# Patient Record
Sex: MALE | Race: WHITE | Employment: FULL TIME | URBAN - METROPOLITAN AREA
[De-identification: names, ages, dates, MRNs, and addresses within clinical notes are randomized per-mention and may not be internally consistent; named-entity substitution may affect disease eponyms.]

---

## 2017-09-26 ENCOUNTER — OFFICE VISIT (OUTPATIENT)
Dept: FAMILY MEDICINE CLINIC | Age: 27
End: 2017-09-26

## 2017-09-26 VITALS
DIASTOLIC BLOOD PRESSURE: 74 MMHG | BODY MASS INDEX: 24.81 KG/M2 | RESPIRATION RATE: 16 BRPM | HEART RATE: 73 BPM | SYSTOLIC BLOOD PRESSURE: 116 MMHG | WEIGHT: 163.7 LBS | TEMPERATURE: 97.9 F | HEIGHT: 68 IN | OXYGEN SATURATION: 98 %

## 2017-09-26 DIAGNOSIS — N64.52 NIPPLE DISCHARGE IN MALE: ICD-10-CM

## 2017-09-26 DIAGNOSIS — Z13.0 SCREENING FOR DEFICIENCY ANEMIA: ICD-10-CM

## 2017-09-26 DIAGNOSIS — D49.7 PITUITARY TUMOR: Primary | ICD-10-CM

## 2017-09-26 DIAGNOSIS — Z13.29 SCREENING FOR THYROID DISORDER: ICD-10-CM

## 2017-09-26 DIAGNOSIS — Z13.220 LIPID SCREENING: ICD-10-CM

## 2017-09-26 PROCEDURE — 99204 OFFICE O/P NEW MOD 45 MIN: CPT | Performed by: FAMILY MEDICINE

## 2017-09-26 ASSESSMENT — ENCOUNTER SYMPTOMS
COUGH: 0
VOMITING: 0
SHORTNESS OF BREATH: 0
FACIAL SWELLING: 0
STRIDOR: 0
SORE THROAT: 0
PHOTOPHOBIA: 0
NAUSEA: 0
ABDOMINAL DISTENTION: 0
BLOOD IN STOOL: 0
CONSTIPATION: 0
APNEA: 0
EYE PAIN: 0
EYE ITCHING: 0
EYE DISCHARGE: 0
CHEST TIGHTNESS: 0
RECTAL PAIN: 0
SINUS PRESSURE: 0
COLOR CHANGE: 0
BACK PAIN: 0
WHEEZING: 0
VOICE CHANGE: 0
ABDOMINAL PAIN: 0
ANAL BLEEDING: 0
EYE REDNESS: 0
CHOKING: 0
TROUBLE SWALLOWING: 0
DIARRHEA: 0
RHINORRHEA: 0

## 2017-09-29 DIAGNOSIS — N64.52 NIPPLE DISCHARGE IN MALE: ICD-10-CM

## 2017-09-29 DIAGNOSIS — Z13.29 SCREENING FOR THYROID DISORDER: ICD-10-CM

## 2017-09-29 DIAGNOSIS — D35.2 PITUITARY MICROADENOMA (HCC): Primary | ICD-10-CM

## 2017-09-29 DIAGNOSIS — D49.7 PITUITARY TUMOR: Primary | ICD-10-CM

## 2017-09-29 DIAGNOSIS — D49.7 PITUITARY TUMOR: ICD-10-CM

## 2017-09-29 DIAGNOSIS — Z13.220 LIPID SCREENING: ICD-10-CM

## 2017-09-29 DIAGNOSIS — Z13.0 SCREENING FOR DEFICIENCY ANEMIA: ICD-10-CM

## 2017-09-29 LAB
A/G RATIO: 1.8 (ref 1.1–2.2)
ALBUMIN SERPL-MCNC: 4.8 G/DL (ref 3.4–5)
ALP BLD-CCNC: 95 U/L (ref 40–129)
ALT SERPL-CCNC: 27 U/L (ref 10–40)
ANION GAP SERPL CALCULATED.3IONS-SCNC: 14 MMOL/L (ref 3–16)
AST SERPL-CCNC: 23 U/L (ref 15–37)
BILIRUB SERPL-MCNC: 0.8 MG/DL (ref 0–1)
BUN BLDV-MCNC: 12 MG/DL (ref 7–20)
CALCIUM SERPL-MCNC: 9.7 MG/DL (ref 8.3–10.6)
CHLORIDE BLD-SCNC: 99 MMOL/L (ref 99–110)
CHOLESTEROL, TOTAL: 166 MG/DL (ref 0–199)
CO2: 27 MMOL/L (ref 21–32)
CREAT SERPL-MCNC: 0.9 MG/DL (ref 0.9–1.3)
GFR AFRICAN AMERICAN: >60
GFR NON-AFRICAN AMERICAN: >60
GLOBULIN: 2.7 G/DL
GLUCOSE BLD-MCNC: 93 MG/DL (ref 70–99)
HCT VFR BLD CALC: 47.1 % (ref 40.5–52.5)
HDLC SERPL-MCNC: 48 MG/DL (ref 40–60)
HEMOGLOBIN: 16.1 G/DL (ref 13.5–17.5)
LDL CHOLESTEROL CALCULATED: 95 MG/DL
MCH RBC QN AUTO: 31.3 PG (ref 26–34)
MCHC RBC AUTO-ENTMCNC: 34.2 G/DL (ref 31–36)
MCV RBC AUTO: 91.6 FL (ref 80–100)
PDW BLD-RTO: 12.7 % (ref 12.4–15.4)
PLATELET # BLD: 228 K/UL (ref 135–450)
PMV BLD AUTO: 8.3 FL (ref 5–10.5)
POTASSIUM SERPL-SCNC: 4.2 MMOL/L (ref 3.5–5.1)
PROLACTIN: 64.8 NG/ML
RBC # BLD: 5.15 M/UL (ref 4.2–5.9)
SODIUM BLD-SCNC: 140 MMOL/L (ref 136–145)
TOTAL PROTEIN: 7.5 G/DL (ref 6.4–8.2)
TRIGL SERPL-MCNC: 114 MG/DL (ref 0–150)
TSH SERPL DL<=0.05 MIU/L-ACNC: 0.52 UIU/ML (ref 0.27–4.2)
VLDLC SERPL CALC-MCNC: 23 MG/DL
WBC # BLD: 6.9 K/UL (ref 4–11)

## 2017-09-30 DIAGNOSIS — R79.89 ELEVATED PROLACTIN LEVEL: Primary | ICD-10-CM

## 2017-09-30 DIAGNOSIS — Z87.898 HISTORY OF PITUITARY TUMOR: ICD-10-CM

## 2017-10-03 LAB
SEX HORMONE BINDING GLOBULIN: 60 NMOL/L (ref 11–80)
TESTOSTERONE FREE-NONMALE: 36.2 PG/ML (ref 47–244)
TESTOSTERONE TOTAL: 278 NG/DL (ref 220–1000)

## 2017-10-04 ENCOUNTER — TELEPHONE (OUTPATIENT)
Dept: FAMILY MEDICINE CLINIC | Age: 27
End: 2017-10-04

## 2017-10-04 DIAGNOSIS — N64.52 NIPPLE DISCHARGE IN MALE: Primary | ICD-10-CM

## 2017-10-04 NOTE — TELEPHONE ENCOUNTER
[address]  [phone]    Radiologist wants PT to have bilateral diagnostic mammogram before ultrasound  Requesting order be entered in  Epic

## 2017-10-05 NOTE — TELEPHONE ENCOUNTER
Called pt to give scheduling dept number to call to schedule at [facility].   Close Encounter

## 2017-10-13 ENCOUNTER — HOSPITAL ENCOUNTER (OUTPATIENT)
Dept: ULTRASOUND IMAGING | Age: 27
Discharge: HOME | End: 2017-10-13
Admitting: FAMILY MEDICINE

## 2017-10-13 DIAGNOSIS — N64.52 NIPPLE DISCHARGE IN MALE: ICD-10-CM

## 2017-10-13 DIAGNOSIS — D49.7 NEOPLASM OF UNSPECIFIED BEHAVIOR OF ENDOCRINE GLANDS AND OTHER PARTS OF NERVOUS SYSTEM: ICD-10-CM

## 2017-10-13 DIAGNOSIS — D49.7 PITUITARY TUMOR: ICD-10-CM

## 2017-10-13 RX ORDER — SODIUM CHLORIDE 0.9 % (FLUSH) 0.9 %
10 SYRINGE (ML) INJECTION ONCE
Status: COMPLETED | OUTPATIENT
Start: 2017-10-13 | End: 2017-10-13

## 2017-10-13 RX ADMIN — Medication 10 ML: at 09:58

## 2017-12-01 ENCOUNTER — TELEPHONE (OUTPATIENT)
Dept: FAMILY MEDICINE CLINIC | Age: 27
End: 2017-12-01

## 2017-12-01 PROBLEM — D35.2 PITUITARY MICROADENOMA (HCC): Status: ACTIVE | Noted: 2017-12-01

## 2017-12-01 PROBLEM — N62 GYNECOMASTIA, MALE: Status: ACTIVE | Noted: 2017-12-01

## 2017-12-01 PROBLEM — R79.89 ELEVATED PROLACTIN LEVEL: Status: ACTIVE | Noted: 2017-12-01

## 2017-12-01 NOTE — TELEPHONE ENCOUNTER
Pt informed to come in when he can and we will work him in as soon as we can.  Ok per dr. Ga Scarce  Close Encounter

## 2017-12-01 NOTE — TELEPHONE ENCOUNTER
[phone]   Jacqueshowie Maday    PT is set for a flu shot at 3:30, wants to know if he can come in earlier. Is already in the area, doesn't want to have to drive home and then come back. Please advise patient.

## 2017-12-07 ENCOUNTER — OFFICE VISIT (OUTPATIENT)
Dept: ENDOCRINOLOGY | Age: 27
End: 2017-12-07

## 2017-12-07 VITALS
OXYGEN SATURATION: 97 % | DIASTOLIC BLOOD PRESSURE: 72 MMHG | BODY MASS INDEX: 25.31 KG/M2 | WEIGHT: 167 LBS | SYSTOLIC BLOOD PRESSURE: 109 MMHG | HEART RATE: 74 BPM | HEIGHT: 68 IN

## 2017-12-07 DIAGNOSIS — D35.2 PITUITARY MICROADENOMA (HCC): ICD-10-CM

## 2017-12-07 DIAGNOSIS — N64.52 NIPPLE DISCHARGE IN MALE: ICD-10-CM

## 2017-12-07 DIAGNOSIS — N62 GYNECOMASTIA, MALE: ICD-10-CM

## 2017-12-07 DIAGNOSIS — R79.89 ELEVATED PROLACTIN LEVEL: ICD-10-CM

## 2017-12-07 DIAGNOSIS — R79.89 ELEVATED PROLACTIN LEVEL: Primary | ICD-10-CM

## 2017-12-07 PROCEDURE — 99244 OFF/OP CNSLTJ NEW/EST MOD 40: CPT | Performed by: INTERNAL MEDICINE

## 2017-12-07 ASSESSMENT — PATIENT HEALTH QUESTIONNAIRE - PHQ9
2. FEELING DOWN, DEPRESSED OR HOPELESS: 0
1. LITTLE INTEREST OR PLEASURE IN DOING THINGS: 0
SUM OF ALL RESPONSES TO PHQ QUESTIONS 1-9: 0
SUM OF ALL RESPONSES TO PHQ9 QUESTIONS 1 & 2: 0

## 2017-12-07 NOTE — PROGRESS NOTES
depression, anxiety, sleep disturbance and decreased concentration. Objective:   Physical Exam:  /72 (Site: Left Arm, Position: Sitting, Cuff Size: Large Adult)   Pulse 74   Ht 5' 8\" (1.727 m)   Wt 167 lb (75.8 kg)   SpO2 97%   BMI 25.39 kg/m²   Constitutional: Patient is oriented to person, place, and time. Patient appears well-developed and well-nourished. HENT:    Head: Normocephalic and atraumatic. Eyes: Conjunctivae and EOM are normal. Pupils are equal, round, and reactive to light. Neck: Normal range of motion. Thyroid exam normal   Cardiovascular: Normal rate, regular rhythm and normal heart sounds. Pulmonary/Chest: Effort normal and breath sounds normal. Bilateral gynecomastia, axillary lymph nodes were not palpable. Abdominal: Soft. Bowel sounds are normal.   Musculoskeletal: Normal range of motion. _  Neurological: Patient is alert and oriented to person, place, and time. Patient has normal reflexes. Skin: Skin is warm and dry. Psychiatric: Patient has a normal mood and affect.  Patient behavior is normal.     Lab Review:    Orders Only on 09/29/2017   Component Date Value Ref Range Status    Testosterone 10/03/2017 278  220 - 1,000 ng/dL Final    Sex Hormone Binding 10/03/2017 60  11 - 80 nmol/L Final    Testosterone, Free 10/03/2017 36.2* 47 - 244 pg/mL Final   Orders Only on 09/29/2017   Component Date Value Ref Range Status    Cholesterol, Total 09/29/2017 166  0 - 199 mg/dL Final    Triglycerides 09/29/2017 114  0 - 150 mg/dL Final    HDL 09/29/2017 48  40 - 60 mg/dL Final    LDL Calculated 09/29/2017 95  <100 mg/dL Final    VLDL CHOLESTEROL CALCULATED 09/29/2017 23  Not Established mg/dL Final    Sodium 09/29/2017 140  136 - 145 mmol/L Final    Potassium 09/29/2017 4.2  3.5 - 5.1 mmol/L Final    Chloride 09/29/2017 99  99 - 110 mmol/L Final    CO2 09/29/2017 27  21 - 32 mmol/L Final    Anion Gap 09/29/2017 14  3 - 16 Final    Glucose 09/29/2017 93  70 - 99 mg/dL Final    BUN 09/29/2017 12  7 - 20 mg/dL Final    CREATININE 09/29/2017 0.9  0.9 - 1.3 mg/dL Final    GFR Non- 09/29/2017 >60  >60 Final    GFR  09/29/2017 >60  >60 Final    Calcium 09/29/2017 9.7  8.3 - 10.6 mg/dL Final    Total Protein 09/29/2017 7.5  6.4 - 8.2 g/dL Final    Alb 09/29/2017 4.8  3.4 - 5.0 g/dL Final    Albumin/Globulin Ratio 09/29/2017 1.8  1.1 - 2.2 Final    Total Bilirubin 09/29/2017 0.8  0.0 - 1.0 mg/dL Final    Alkaline Phosphatase 09/29/2017 95  40 - 129 U/L Final    ALT 09/29/2017 27  10 - 40 U/L Final    AST 09/29/2017 23  15 - 37 U/L Final    Globulin 09/29/2017 2.7  g/dL Final    WBC 09/29/2017 6.9  4.0 - 11.0 K/uL Final    RBC 09/29/2017 5.15  4.20 - 5.90 M/uL Final    Hemoglobin 09/29/2017 16.1  13.5 - 17.5 g/dL Final    Hematocrit 09/29/2017 47.1  40.5 - 52.5 % Final    MCV 09/29/2017 91.6  80.0 - 100.0 fL Final    MCH 09/29/2017 31.3  26.0 - 34.0 pg Final    MCHC 09/29/2017 34.2  31.0 - 36.0 g/dL Final    RDW 09/29/2017 12.7  12.4 - 15.4 % Final    Platelets 03/32/2804 228  135 - 450 K/uL Final    MPV 09/29/2017 8.3  5.0 - 10.5 fL Final    Prolactin 09/29/2017 64.8  ng/mL Final    TSH 09/29/2017 0.52  0.27 - 4.20 uIU/mL Final           Assessment and Plan     Elisha Stanton was seen today for new patient. Diagnoses and all orders for this visit:    Elevated [facility] level ([facility]  -     Prolactin; Future  -     T4, Free; Future  -     T3, Free; Future  -     TSH without Reflex; Future  -     Comprehensive Metabolic Panel; Future  -     MISCELLANEOUS SENDOUT 1; Future  -     Prolactin; Standing  -     Comprehensive Metabolic Panel; Standing    Pituitary microadenoma (HCC)  -     T4, Free; Future  -     T3, Free; Future  -     TSH without Reflex;  Future  -     Prolactin; Standing  -     Comprehensive Metabolic Panel; Standing    Gynecomastia, male    Nipple discharge in male:left        1: Hyperprolactinemia

## 2017-12-08 ENCOUNTER — TELEPHONE (OUTPATIENT)
Dept: ENDOCRINOLOGY | Age: 27
End: 2017-12-08

## 2017-12-08 DIAGNOSIS — D35.2 PITUITARY MICROADENOMA (HCC): ICD-10-CM

## 2017-12-08 DIAGNOSIS — R79.89 ELEVATED PROLACTIN LEVEL: Primary | ICD-10-CM

## 2017-12-08 LAB
A/G RATIO: 2 (ref 1.1–2.2)
ALBUMIN SERPL-MCNC: 4.9 G/DL (ref 3.4–5)
ALP BLD-CCNC: 80 U/L (ref 40–129)
ALT SERPL-CCNC: 19 U/L (ref 10–40)
ANION GAP SERPL CALCULATED.3IONS-SCNC: 13 MMOL/L (ref 3–16)
AST SERPL-CCNC: 20 U/L (ref 15–37)
BILIRUB SERPL-MCNC: 0.7 MG/DL (ref 0–1)
BUN BLDV-MCNC: 14 MG/DL (ref 7–20)
CALCIUM SERPL-MCNC: 9.8 MG/DL (ref 8.3–10.6)
CHLORIDE BLD-SCNC: 100 MMOL/L (ref 99–110)
CO2: 28 MMOL/L (ref 21–32)
CREAT SERPL-MCNC: 0.8 MG/DL (ref 0.9–1.3)
GFR AFRICAN AMERICAN: >60
GFR NON-AFRICAN AMERICAN: >60
GLOBULIN: 2.4 G/DL
GLUCOSE BLD-MCNC: 90 MG/DL (ref 70–99)
POTASSIUM SERPL-SCNC: 4.3 MMOL/L (ref 3.5–5.1)
PROLACTIN: 49.3 NG/ML
SODIUM BLD-SCNC: 141 MMOL/L (ref 136–145)
T3 FREE: 3.5 PG/ML (ref 2.3–4.2)
T4 FREE: 1.3 NG/DL (ref 0.9–1.8)
TOTAL PROTEIN: 7.3 G/DL (ref 6.4–8.2)
TSH SERPL DL<=0.05 MIU/L-ACNC: 0.4 UIU/ML (ref 0.27–4.2)

## 2017-12-08 RX ORDER — CABERGOLINE 0.5 MG/1
0.25 TABLET ORAL
Qty: 8 TABLET | Refills: 1 | Status: SHIPPED | OUTPATIENT
Start: 2017-12-11 | End: 2018-04-06 | Stop reason: SDUPTHER

## 2017-12-08 NOTE — TELEPHONE ENCOUNTER
----- Message from Fe Nieves MD sent at 12/8/2017 10:14 AM EST -----  Please make him a follow up in 3 months.

## 2017-12-09 LAB — MISCELLANEOUS LAB TEST ORDER: ABNORMAL

## 2018-03-07 ENCOUNTER — TELEPHONE (OUTPATIENT)
Dept: ENDOCRINOLOGY | Age: 28
End: 2018-03-07

## 2018-03-13 ENCOUNTER — OFFICE VISIT (OUTPATIENT)
Dept: ENDOCRINOLOGY | Age: 28
End: 2018-03-13

## 2018-03-13 VITALS
WEIGHT: 164 LBS | BODY MASS INDEX: 24.86 KG/M2 | HEIGHT: 68 IN | DIASTOLIC BLOOD PRESSURE: 86 MMHG | OXYGEN SATURATION: 98 % | HEART RATE: 70 BPM | SYSTOLIC BLOOD PRESSURE: 149 MMHG

## 2018-03-13 DIAGNOSIS — R79.89 ELEVATED PROLACTIN LEVEL: ICD-10-CM

## 2018-03-13 DIAGNOSIS — D35.2 PITUITARY MICROADENOMA (HCC): ICD-10-CM

## 2018-03-13 DIAGNOSIS — E29.1 HYPOGONADISM IN MALE: ICD-10-CM

## 2018-03-13 DIAGNOSIS — D35.2 PITUITARY MICROADENOMA (HCC): Primary | ICD-10-CM

## 2018-03-13 LAB
A/G RATIO: 2.5 (ref 1.1–2.2)
ALBUMIN SERPL-MCNC: 4.9 G/DL (ref 3.4–5)
ALP BLD-CCNC: 69 U/L (ref 40–129)
ALT SERPL-CCNC: 17 U/L (ref 10–40)
ANION GAP SERPL CALCULATED.3IONS-SCNC: 15 MMOL/L (ref 3–16)
AST SERPL-CCNC: 15 U/L (ref 15–37)
BILIRUB SERPL-MCNC: 0.8 MG/DL (ref 0–1)
BUN BLDV-MCNC: 10 MG/DL (ref 7–20)
CALCIUM SERPL-MCNC: 9.5 MG/DL (ref 8.3–10.6)
CHLORIDE BLD-SCNC: 101 MMOL/L (ref 99–110)
CO2: 30 MMOL/L (ref 21–32)
CREAT SERPL-MCNC: 0.7 MG/DL (ref 0.9–1.3)
GFR AFRICAN AMERICAN: >60
GFR NON-AFRICAN AMERICAN: >60
GLOBULIN: 2 G/DL
GLUCOSE BLD-MCNC: 96 MG/DL (ref 70–99)
POTASSIUM SERPL-SCNC: 4.1 MMOL/L (ref 3.5–5.1)
PROLACTIN: 2.5 NG/ML
SODIUM BLD-SCNC: 146 MMOL/L (ref 136–145)
TOTAL PROTEIN: 6.9 G/DL (ref 6.4–8.2)

## 2018-03-13 PROCEDURE — 99214 OFFICE O/P EST MOD 30 MIN: CPT | Performed by: INTERNAL MEDICINE

## 2018-03-13 ASSESSMENT — PATIENT HEALTH QUESTIONNAIRE - PHQ9
SUM OF ALL RESPONSES TO PHQ QUESTIONS 1-9: 0
1. LITTLE INTEREST OR PLEASURE IN DOING THINGS: 0
SUM OF ALL RESPONSES TO PHQ9 QUESTIONS 1 & 2: 0
2. FEELING DOWN, DEPRESSED OR HOPELESS: 0

## 2018-03-13 NOTE — PROGRESS NOTES
Patient ID:   Dinesh Guo is a 32 y.o. male      Chief Complaint:   Dinesh Guo is seen for an evaluation of elevated prolactin levels. Subjective:   He was Prolactin 52.9 in April 2012, highest 102 in June 2014 and then 3.9 in Sep 2014. MRI in 2012 showed 6mm pituitary adenoma which resolved in 2013 (only stalk deviation seen). W up was negative for Gh, cortisol and thyroid hormone in 2012. Prolactin 64.8, , Free T slightly low at 36.8, TSH normal in Sep 2017   MRI brain in Oct 2017 showed right sided pituitary esion of 7x5mm hypoenhancing, deviating the infundubulun to left. Optic chiasm normal.     Mammogram and US breast showed gynecomastia in Oct 2017. Slightly more on left than right. Prolactin 49 (in house) , 27.4 ( [facility])     Cabergoline 0.25mg twice weekly (Wednesday and Sundays)   Bromocriptine caused lethargy. Then switched to cabergoline and stopped in 2015. Testoterone levels normalized on treatment. Nipple discharge and breast tenderness has resolved. Libido good, performance good, have morning erections now.     Energy levels good, warmer usually as compared to his wife   Regular day to day stress   Has a 3year old daughter, not trying at this time to conceive     Not on antipsychotics, SSRIs, estrogen supplements, antiemetics, Ca channel blockers, methyldopa, opioids  Denies drugs (heroine)     No family history of hyperprolactinemia or any endocrine tumors     The following portions of the patient's history were reviewed and updated as appropriate:       Family History   Problem Relation Age of Onset    No Known Problems Mother     Heart Disease Father     Mult Sclerosis Father     Diabetes Maternal Grandmother     Cancer Paternal Grandmother      leukemia    No Known Problems Paternal Grandfather     No Known Problems Sister     No Known Problems Brother          Social History     Social History    Marital status:      Spouse name: N/A   Vicki Pugh Number of children: N/A    Years of education: N/A     Occupational History    [employer]:. Social History Main Topics    Smoking status: Never Smoker    Smokeless tobacco: Never Used    Alcohol use Yes      Comment: 1 drink per day     Drug use: No    Sexual activity: Yes     Partners: Female      Comment: - 1 child     Other Topics Concern    Not on file     Social History Narrative    No narrative on file         Past Medical History:   Diagnosis Date    Athlete's foot     Brain tumor ([facility] 04/2012    Per pt' was pituitary that has resolved.  Eye problem     Right:eye growth:under care of eye specialist:asymptomatic. No vision deficits.  Increased prolactin level (HCC)     Pituitary microadenoma ([facility] 12/1/2017         Past Surgical History:   Procedure Laterality Date    WISDOM TOOTH EXTRACTION         No Known Allergies      Current Outpatient Prescriptions:     cabergoline (DOSTINEX) 0.5 MG tablet, Take 0.5 tablets by mouth Twice a Week, Disp: 8 tablet, Rfl: 1    Review of Systems:  Constitutional: Negative for fever, chills, and unexpected weight change. HENT: Negative for congestion, ear pain, rhinorrhea,  sore throat and trouble swallowing. Eyes: Negative for photophobia, redness, itching. Respiratory: Negative for cough, shortness of breath and sputum. Cardiovascular: Negative for chest pain, palpitations and leg swelling. Gastrointestinal: Negative for nausea, vomiting, abdominal pain, diarrhea, constipation. Endocrine: Negative for cold intolerance, heat intolerance, polydipsia, polyphagia and polyuria. Genitourinary: Negative for dysuria, urgency, frequency, hematuria and flank pain. Musculoskeletal: Negative for myalgias, back pain, arthralgias and neck pain. Skin/Nail: Negative for rash, itching. Normal nails. Neurological: Negative for seizures, weakness, light-headedness, numbness and headaches.    Hematological/ Lymph nodes: Negative for

## 2018-04-06 DIAGNOSIS — R79.89 ELEVATED PROLACTIN LEVEL: ICD-10-CM

## 2018-04-06 DIAGNOSIS — D35.2 PITUITARY MICROADENOMA (HCC): ICD-10-CM

## 2018-04-06 RX ORDER — CABERGOLINE 0.5 MG/1
0.25 TABLET ORAL
Qty: 8 TABLET | Refills: 1 | Status: SHIPPED | OUTPATIENT
Start: 2018-04-09 | End: 2018-07-24 | Stop reason: SDUPTHER

## 2018-07-23 DIAGNOSIS — E29.1 HYPOGONADISM IN MALE: ICD-10-CM

## 2018-07-23 DIAGNOSIS — R79.89 ELEVATED PROLACTIN LEVEL: ICD-10-CM

## 2018-07-23 DIAGNOSIS — D35.2 PITUITARY MICROADENOMA (HCC): ICD-10-CM

## 2018-07-23 LAB — PROLACTIN: 1.2 NG/ML

## 2018-07-24 ENCOUNTER — OFFICE VISIT (OUTPATIENT)
Dept: ENDOCRINOLOGY | Age: 28
End: 2018-07-24

## 2018-07-24 VITALS
HEART RATE: 74 BPM | HEIGHT: 68 IN | SYSTOLIC BLOOD PRESSURE: 115 MMHG | OXYGEN SATURATION: 96 % | WEIGHT: 167 LBS | BODY MASS INDEX: 25.31 KG/M2 | DIASTOLIC BLOOD PRESSURE: 71 MMHG

## 2018-07-24 DIAGNOSIS — D35.2 PITUITARY MICROADENOMA (HCC): ICD-10-CM

## 2018-07-24 DIAGNOSIS — D49.7 PITUITARY TUMOR: Primary | ICD-10-CM

## 2018-07-24 DIAGNOSIS — R79.89 ELEVATED PROLACTIN LEVEL: ICD-10-CM

## 2018-07-24 LAB
SEX HORMONE BINDING GLOBULIN: 42 NMOL/L (ref 11–80)
TESTOSTERONE FREE PERCENT: 1.7 % (ref 1.6–2.9)
TESTOSTERONE FREE, CALC: 91 PG/ML (ref 47–244)
TESTOSTERONE TOTAL-MALE: 550 NG/DL (ref 300–1080)
TESTOSTERONE, BIOAVAILABLE: 259 NG/DL (ref 131–682)

## 2018-07-24 PROCEDURE — 99214 OFFICE O/P EST MOD 30 MIN: CPT | Performed by: INTERNAL MEDICINE

## 2018-07-24 RX ORDER — CABERGOLINE 0.5 MG/1
0.25 TABLET ORAL
Qty: 8 TABLET | Refills: 2 | Status: SHIPPED | OUTPATIENT
Start: 2018-07-26 | End: 2018-10-01 | Stop reason: SDUPTHER

## 2018-07-24 ASSESSMENT — PATIENT HEALTH QUESTIONNAIRE - PHQ9
2. FEELING DOWN, DEPRESSED OR HOPELESS: 0
SUM OF ALL RESPONSES TO PHQ9 QUESTIONS 1 & 2: 0
1. LITTLE INTEREST OR PLEASURE IN DOING THINGS: 0
SUM OF ALL RESPONSES TO PHQ QUESTIONS 1-9: 0

## 2018-07-24 NOTE — PROGRESS NOTES
 Marital status:      Spouse name: N/A    Number of children: N/A    Years of education: N/A     Occupational History    [employer]:. Social History Main Topics    Smoking status: Never Smoker    Smokeless tobacco: Never Used    Alcohol use Yes      Comment: 1 drink per day     Drug use: No    Sexual activity: Yes     Partners: Female      Comment: - 1 child     Other Topics Concern    Not on file     Social History Narrative    No narrative on file         Past Medical History:   Diagnosis Date    Athlete's foot     Brain tumor ([facility] 04/2012    Per pt' was pituitary that has resolved.  Eye problem     Right:eye growth:under care of eye specialist:asymptomatic. No vision deficits.  Increased prolactin level (HCC)     Pituitary microadenoma ([facility] 12/1/2017         Past Surgical History:   Procedure Laterality Date    WISDOM TOOTH EXTRACTION         No Known Allergies      Current Outpatient Prescriptions:     [START ON 7/26/2018] cabergoline (DOSTINEX) 0.5 MG tablet, Take 0.5 tablets by mouth Twice a Week, Disp: 8 tablet, Rfl: 2    Review of Systems:  Constitutional: Negative for fever, chills, and unexpected weight change. HENT: Negative for congestion, ear pain, rhinorrhea,  sore throat and trouble swallowing. Eyes: Negative for photophobia, redness, itching. Respiratory: Negative for cough, shortness of breath and sputum. Cardiovascular: Negative for chest pain, palpitations and leg swelling. Gastrointestinal: Negative for nausea, vomiting, abdominal pain, diarrhea, constipation. Endocrine: Negative for cold intolerance, heat intolerance, polydipsia, polyphagia and polyuria. Genitourinary: Negative for dysuria, urgency, frequency, hematuria and flank pain. Musculoskeletal: Negative for myalgias, back pain, arthralgias and neck pain. Skin/Nail: Negative for rash, itching. Normal nails.    Neurological: Negative for seizures, weakness, Assessment and Plan     Azeb Morton was seen today for follow-up. Diagnoses and all orders for this visit:    Pituitary tumor  -     Prolactin; Standing  -     Comprehensive Metabolic Panel; Future  -     TSH without Reflex; Future  -     T4, Free; Future  -     T3, Free; Future  -     MRI PITUITARY W/WO CONTRAST; Future  -     Testosterone,  w SHBG Adult Male; Future  -     Follicle Stimulating Hormone; Future  -     Luteinizing Hormone; Future    Pituitary microadenoma (HCC)  -     Prolactin; Standing  -     Comprehensive Metabolic Panel; Future  -     TSH without Reflex; Future  -     T4, Free; Future  -     T3, Free; Future  -     cabergoline (DOSTINEX) 0.5 MG tablet; Take 0.5 tablets by mouth Twice a Week  -     MRI PITUITARY W/WO CONTRAST; Future    Elevated prolactin level (HCC)  -     cabergoline (DOSTINEX) 0.5 MG tablet; Take 0.5 tablets by mouth Twice a Week        1: Hyperprolactinemia   Microprolactinoma     Prolactin normal     Will start cutting Cabergoline when the MRI shows no tumor. Symptoms improved     Recommend having VF exam     C/w cabergoline 0.25mg twice a week.  No murmur today     MRI in Oct 2018     2: Hypogonadism   Will recheck when prolactin is suppressed     3: Pituitary microadenoma   Repeat MRI Oct 2018     Prolactin levels in 2 months with MRI   Prolactin levels in Dec 2018  Prolactin in 6 months with RTC with TFTs, TT, LH, FSH, CMP    Elissa Sims MD

## 2018-09-25 ENCOUNTER — TELEPHONE (OUTPATIENT)
Dept: FAMILY MEDICINE CLINIC | Age: 28
End: 2018-09-25

## 2018-10-01 DIAGNOSIS — R79.89 ELEVATED PROLACTIN LEVEL: ICD-10-CM

## 2018-10-01 DIAGNOSIS — D35.2 PITUITARY MICROADENOMA (HCC): ICD-10-CM

## 2018-10-01 RX ORDER — CABERGOLINE 0.5 MG/1
0.25 TABLET ORAL
Qty: 8 TABLET | Refills: 2 | Status: SHIPPED | OUTPATIENT
Start: 2018-10-01 | End: 2019-04-04 | Stop reason: SDUPTHER

## 2018-10-02 ENCOUNTER — OFFICE VISIT (OUTPATIENT)
Dept: FAMILY MEDICINE CLINIC | Age: 28
End: 2018-10-02
Payer: COMMERCIAL

## 2018-10-02 VITALS
OXYGEN SATURATION: 98 % | DIASTOLIC BLOOD PRESSURE: 68 MMHG | TEMPERATURE: 98.6 F | SYSTOLIC BLOOD PRESSURE: 106 MMHG | HEIGHT: 68 IN | HEART RATE: 74 BPM | WEIGHT: 166.3 LBS | RESPIRATION RATE: 16 BRPM | BODY MASS INDEX: 25.2 KG/M2

## 2018-10-02 DIAGNOSIS — B35.1 ONYCHOMYCOSIS: ICD-10-CM

## 2018-10-02 DIAGNOSIS — Z13.220 LIPID SCREENING: ICD-10-CM

## 2018-10-02 DIAGNOSIS — E66.3 OVERWEIGHT (BMI 25.0-29.9): ICD-10-CM

## 2018-10-02 DIAGNOSIS — D35.2 PITUITARY MICROADENOMA (HCC): ICD-10-CM

## 2018-10-02 DIAGNOSIS — Z23 NEED FOR INFLUENZA VACCINATION: ICD-10-CM

## 2018-10-02 DIAGNOSIS — D49.7 PITUITARY TUMOR: ICD-10-CM

## 2018-10-02 DIAGNOSIS — Z00.00 ROUTINE GENERAL MEDICAL EXAMINATION AT A HEALTH CARE FACILITY: Primary | ICD-10-CM

## 2018-10-02 PROCEDURE — 90686 IIV4 VACC NO PRSV 0.5 ML IM: CPT | Performed by: FAMILY MEDICINE

## 2018-10-02 PROCEDURE — 99395 PREV VISIT EST AGE 18-39: CPT | Performed by: FAMILY MEDICINE

## 2018-10-02 PROCEDURE — 90471 IMMUNIZATION ADMIN: CPT | Performed by: FAMILY MEDICINE

## 2018-10-02 RX ORDER — TERBINAFINE HYDROCHLORIDE 250 MG/1
250 TABLET ORAL DAILY
Qty: 30 TABLET | Refills: 2 | Status: SHIPPED | OUTPATIENT
Start: 2018-10-02 | End: 2018-11-01

## 2018-10-02 ASSESSMENT — ENCOUNTER SYMPTOMS
EYE ITCHING: 0
COUGH: 0
EYE DISCHARGE: 0
COLOR CHANGE: 0
SORE THROAT: 0
DIARRHEA: 0
SINUS PRESSURE: 0
VOMITING: 0
RHINORRHEA: 0
ABDOMINAL PAIN: 0
WHEEZING: 0
RECTAL PAIN: 0
VOICE CHANGE: 0
STRIDOR: 0
BACK PAIN: 0
SHORTNESS OF BREATH: 0
TROUBLE SWALLOWING: 0
SINUS PAIN: 0
ABDOMINAL DISTENTION: 0
EYE PAIN: 0
APNEA: 0
ANAL BLEEDING: 0
CHEST TIGHTNESS: 0
NAUSEA: 0
CONSTIPATION: 0
CHOKING: 0
BLOOD IN STOOL: 0
FACIAL SWELLING: 0
PHOTOPHOBIA: 0
EYE REDNESS: 0

## 2018-10-02 ASSESSMENT — PATIENT HEALTH QUESTIONNAIRE - PHQ9
2. FEELING DOWN, DEPRESSED OR HOPELESS: 0
SUM OF ALL RESPONSES TO PHQ QUESTIONS 1-9: 0
1. LITTLE INTEREST OR PLEASURE IN DOING THINGS: 0
SUM OF ALL RESPONSES TO PHQ QUESTIONS 1-9: 0
SUM OF ALL RESPONSES TO PHQ9 QUESTIONS 1 & 2: 0

## 2018-10-02 NOTE — PATIENT INSTRUCTIONS
condoms. For men  · Tests for sexually transmitted infections (STIs). Ask whether you should have tests for STIs. You may be at risk if you have sex with more than one person, especially if you do not wear a condom. · Testicular cancer exam. Ask your doctor whether you should check your testicles regularly. · Prostate exam. Talk to your doctor about whether you should have a blood test (called a PSA test) for prostate cancer. Experts differ on whether and when men should have this test. Some experts suggest it if you are older than 39 and are -American or have a father or brother who got prostate cancer when he was younger than 72. When should you call for help? Watch closely for changes in your health, and be sure to contact your doctor if you have any problems or symptoms that concern you. Where can you learn more? Go to https://Ravnpeyohaneb.health[email or web address] org and sign in to your [employer] account. Enter P072 in the LogLogic box to learn more about \"Well Visit, Ages 25 to 48: Care Instructions. \"     If you do not have an account, please click on the \"Sign Up Now\" link. Current as of: May 16, 2017  Content Version: 11.7  © 0422-8041 [employer]. Care instructions adapted under license by [facility] ([facility]). If you have questions about a medical condition or this instruction, always ask your healthcare professional. [address]  any warranty or liability for your use of this information.

## 2018-10-02 NOTE — PROGRESS NOTES
Subjective:      Patient ID: Sara Mcgowan is a 32 y.o. male. HPI  Presenting for:Routine Physical exam  PSA:N/A.  Performs self-testicular exams:Yes  Eye check:<12mos. Dental check:<12mos. Lipid check:wishes to proceed. Colonoscopy:N/A  Immunizations:see below. Last tetanus vaccine per pt' received <10yrs ago. Tomasz Rendon Exercise:formal regime=no but walks daily at work. Smoker:no. Nail fungus both toes:prior topical med failed. Presenting w/mild right hallux/right 5th toe;left 2nd toe nail discolored(yellow-light green) x 2-3yrs. Not worsening or improving. Preceding injury:none recalled. Associated w/\"crumbling\"/thick nails. Antalgic Gait:no. Worsened by nothing. Improves w/nothing. Denies foot pain or other skin lesions/BLE fockfapc-kydmemfab-bdiwurofqzu/ulcers/fever/chills/gait abnormality/open wounds        Immunization History   Administered Date(s) Administered    Hepatitis B Adult (Engerix-B) 03/20/2018, 05/24/2018    MMR 03/20/2018    PPD Test 07/12/2017, 03/20/2018    Varicella (Varivax) 03/20/2018, 05/04/2018       No Known Allergies    Current Outpatient Prescriptions on File Prior to Visit   Medication Sig Dispense Refill    cabergoline (DOSTINEX) 0.5 MG tablet Take 0.5 tablets by mouth Twice a Week 8 tablet 2     No current facility-administered medications on file prior to visit. Past Medical History:   Diagnosis Date    Athlete's foot     Brain tumor ([address] 04/2012    Per pt' was pituitary that has resolved.  Eye problem     Right:eye growth:under care of eye specialist:asymptomatic. No vision deficits.     Increased prolactin level (HCC)     Pituitary microadenoma ([address] 12/1/2017       Past Surgical History:   Procedure Laterality Date    WISDOM TOOTH EXTRACTION         Social History   Substance Use Topics    Smoking status: Never Smoker    Smokeless tobacco: Never Used    Alcohol use Yes      Comment: 1 drink per day      History   Drug Use No       Family refill=2-3secs   Psychiatric: He has a normal mood and affect. His speech is normal and behavior is normal. Judgment and thought content normal. Cognition and memory are normal.   Good eye contact. Assessment:       Diagnosis Orders   1. Routine general medical examination at a health care facility  VSS/well appearing. Doing well. 2. Pituitary microadenoma ([address]  Per endo. Keep upcoming appt for MRI brain. 3. Pituitary tumor  Per endo. 4. Lipid screening  Lipid Panel    Comprehensive Metabolic Panel   5. Overweight (BMI 25.0-29. 9)  The patient is asked to make an attempt to improve diet and exercise patterns to aid in medical management of this problem. 6. Need for influenza vaccination  INFLUENZA, QUADV, 3 YRS AND OLDER, IM, Vaccine Counseling:Risks and benefits of vaccines discussed with patient and questions answered. PF, PREFILL SYR OR SDV, 0.5ML (FLUZONE QUADV, PF)   7. Onychomycosis:lamisil x00nfevi. CMP(LFT) this week prior to starting med. LFTs in 4weeks after starting med. Possible med side effects reviewed. Pt' wishes to proceed w/med        Plan:      Pt' left office in good condition. Obtain labs/diagnostic tests as discussed today & call back for results within 2-7days. Advised to go to local ER or call 911 for any worrisome signs/sx.           Jennifer Wallace MD

## 2018-10-04 ENCOUNTER — HOSPITAL ENCOUNTER (OUTPATIENT)
Dept: MRI IMAGING | Age: 28
Discharge: HOME | End: 2018-10-04
Payer: COMMERCIAL

## 2018-10-04 DIAGNOSIS — D35.2 PITUITARY MICROADENOMA (HCC): ICD-10-CM

## 2018-10-04 DIAGNOSIS — D49.7 PITUITARY TUMOR: ICD-10-CM

## 2018-10-04 PROCEDURE — 6360000004 HC RX CONTRAST MEDICATION: Performed by: FAMILY MEDICINE

## 2018-10-04 PROCEDURE — A9579 GAD-BASE MR CONTRAST NOS,1ML: HCPCS | Performed by: FAMILY MEDICINE

## 2018-10-04 PROCEDURE — 70553 MRI BRAIN STEM W/O & W/DYE: CPT

## 2018-10-04 PROCEDURE — 2580000003 HC RX 258: Performed by: FAMILY MEDICINE

## 2018-10-04 RX ORDER — 0.9 % SODIUM CHLORIDE 0.9 %
10 VIAL (ML) INJECTION
Status: COMPLETED | OUTPATIENT
Start: 2018-10-04 | End: 2018-10-04

## 2018-10-04 RX ADMIN — Medication 10 ML: at 15:48

## 2018-10-04 RX ADMIN — GADOTERIDOL 15 ML: 279.3 INJECTION, SOLUTION INTRAVENOUS at 15:48

## 2018-10-08 DIAGNOSIS — Z13.220 LIPID SCREENING: ICD-10-CM

## 2018-10-08 LAB
A/G RATIO: 2.1 (ref 1.1–2.2)
ALBUMIN SERPL-MCNC: 4.7 G/DL (ref 3.4–5)
ALP BLD-CCNC: 70 U/L (ref 40–129)
ALT SERPL-CCNC: 17 U/L (ref 10–40)
ANION GAP SERPL CALCULATED.3IONS-SCNC: 12 MMOL/L (ref 3–16)
AST SERPL-CCNC: 17 U/L (ref 15–37)
BILIRUB SERPL-MCNC: 1.1 MG/DL (ref 0–1)
BUN BLDV-MCNC: 16 MG/DL (ref 7–20)
CALCIUM SERPL-MCNC: 9.6 MG/DL (ref 8.3–10.6)
CHLORIDE BLD-SCNC: 105 MMOL/L (ref 99–110)
CHOLESTEROL, TOTAL: 152 MG/DL (ref 0–199)
CO2: 25 MMOL/L (ref 21–32)
CREAT SERPL-MCNC: 1 MG/DL (ref 0.9–1.3)
GFR AFRICAN AMERICAN: >60
GFR NON-AFRICAN AMERICAN: >60
GLOBULIN: 2.2 G/DL
GLUCOSE BLD-MCNC: 93 MG/DL (ref 70–99)
HDLC SERPL-MCNC: 50 MG/DL (ref 40–60)
LDL CHOLESTEROL CALCULATED: 87 MG/DL
POTASSIUM SERPL-SCNC: 4.4 MMOL/L (ref 3.5–5.1)
SODIUM BLD-SCNC: 142 MMOL/L (ref 136–145)
TOTAL PROTEIN: 6.9 G/DL (ref 6.4–8.2)
TRIGL SERPL-MCNC: 77 MG/DL (ref 0–150)
VLDLC SERPL CALC-MCNC: 15 MG/DL

## 2019-01-30 ENCOUNTER — OFFICE VISIT (OUTPATIENT)
Dept: ENDOCRINOLOGY | Age: 29
End: 2019-01-30
Payer: COMMERCIAL

## 2019-01-30 VITALS
BODY MASS INDEX: 25.76 KG/M2 | HEART RATE: 66 BPM | DIASTOLIC BLOOD PRESSURE: 69 MMHG | OXYGEN SATURATION: 100 % | HEIGHT: 68 IN | SYSTOLIC BLOOD PRESSURE: 110 MMHG | WEIGHT: 170 LBS

## 2019-01-30 DIAGNOSIS — R79.89 ELEVATED PROLACTIN LEVEL: ICD-10-CM

## 2019-01-30 DIAGNOSIS — D35.2 PITUITARY MICROADENOMA (HCC): Primary | ICD-10-CM

## 2019-01-30 DIAGNOSIS — D35.2 PITUITARY MICROADENOMA (HCC): ICD-10-CM

## 2019-01-30 PROBLEM — N64.52 NIPPLE DISCHARGE IN MALE: Status: RESOLVED | Noted: 2017-09-26 | Resolved: 2019-01-30

## 2019-01-30 LAB
A/G RATIO: 2 (ref 1.1–2.2)
ALBUMIN SERPL-MCNC: 4.7 G/DL (ref 3.4–5)
ALP BLD-CCNC: 75 U/L (ref 40–129)
ALT SERPL-CCNC: 23 U/L (ref 10–40)
ANION GAP SERPL CALCULATED.3IONS-SCNC: 14 MMOL/L (ref 3–16)
AST SERPL-CCNC: 18 U/L (ref 15–37)
BILIRUB SERPL-MCNC: 0.8 MG/DL (ref 0–1)
BUN BLDV-MCNC: 12 MG/DL (ref 7–20)
CALCIUM SERPL-MCNC: 9.7 MG/DL (ref 8.3–10.6)
CHLORIDE BLD-SCNC: 104 MMOL/L (ref 99–110)
CO2: 26 MMOL/L (ref 21–32)
CREAT SERPL-MCNC: 0.9 MG/DL (ref 0.9–1.3)
GFR AFRICAN AMERICAN: >60
GFR NON-AFRICAN AMERICAN: >60
GLOBULIN: 2.4 G/DL
GLUCOSE BLD-MCNC: 91 MG/DL (ref 70–99)
POTASSIUM SERPL-SCNC: 4.2 MMOL/L (ref 3.5–5.1)
PROLACTIN: 0.9 NG/ML
SODIUM BLD-SCNC: 144 MMOL/L (ref 136–145)
TOTAL PROTEIN: 7.1 G/DL (ref 6.4–8.2)

## 2019-01-30 PROCEDURE — 99214 OFFICE O/P EST MOD 30 MIN: CPT | Performed by: INTERNAL MEDICINE

## 2019-01-30 RX ORDER — TERBINAFINE HYDROCHLORIDE 250 MG/1
TABLET ORAL
COMMUNITY
Start: 2018-12-28 | End: 2019-02-08 | Stop reason: ALTCHOICE

## 2019-02-08 ENCOUNTER — OFFICE VISIT (OUTPATIENT)
Dept: FAMILY MEDICINE CLINIC | Age: 29
End: 2019-02-08
Payer: COMMERCIAL

## 2019-02-08 VITALS
DIASTOLIC BLOOD PRESSURE: 76 MMHG | SYSTOLIC BLOOD PRESSURE: 117 MMHG | WEIGHT: 169.1 LBS | BODY MASS INDEX: 25.63 KG/M2 | RESPIRATION RATE: 16 BRPM | HEIGHT: 68 IN | HEART RATE: 71 BPM

## 2019-02-08 DIAGNOSIS — B35.1 ONYCHOMYCOSIS: Primary | ICD-10-CM

## 2019-02-08 DIAGNOSIS — D35.2 PITUITARY MICROADENOMA (HCC): ICD-10-CM

## 2019-02-08 PROCEDURE — 99213 OFFICE O/P EST LOW 20 MIN: CPT | Performed by: FAMILY MEDICINE

## 2019-02-08 RX ORDER — TERBINAFINE HYDROCHLORIDE 250 MG/1
250 TABLET ORAL DAILY
Qty: 30 TABLET | Refills: 2 | Status: CANCELLED | OUTPATIENT
Start: 2019-02-08 | End: 2019-03-10

## 2019-02-08 ASSESSMENT — PATIENT HEALTH QUESTIONNAIRE - PHQ9
2. FEELING DOWN, DEPRESSED OR HOPELESS: 0
SUM OF ALL RESPONSES TO PHQ QUESTIONS 1-9: 0
1. LITTLE INTEREST OR PLEASURE IN DOING THINGS: 0
SUM OF ALL RESPONSES TO PHQ9 QUESTIONS 1 & 2: 0
SUM OF ALL RESPONSES TO PHQ QUESTIONS 1-9: 0

## 2019-02-08 ASSESSMENT — ENCOUNTER SYMPTOMS
ABDOMINAL DISTENTION: 0
ABDOMINAL PAIN: 0
SHORTNESS OF BREATH: 0

## 2019-03-13 ENCOUNTER — OFFICE VISIT (OUTPATIENT)
Dept: FAMILY MEDICINE CLINIC | Age: 29
End: 2019-03-13
Payer: COMMERCIAL

## 2019-03-13 VITALS
OXYGEN SATURATION: 98 % | WEIGHT: 167.2 LBS | SYSTOLIC BLOOD PRESSURE: 110 MMHG | DIASTOLIC BLOOD PRESSURE: 76 MMHG | HEART RATE: 64 BPM | BODY MASS INDEX: 25.34 KG/M2 | HEIGHT: 68 IN

## 2019-03-13 DIAGNOSIS — K64.9 HEMORRHOIDS, UNSPECIFIED HEMORRHOID TYPE: ICD-10-CM

## 2019-03-13 DIAGNOSIS — M51.36 LUMBAR DEGENERATIVE DISC DISEASE: ICD-10-CM

## 2019-03-13 PROBLEM — D49.7 PITUITARY TUMOR: Status: RESOLVED | Noted: 2017-09-26 | Resolved: 2019-03-13

## 2019-03-13 PROBLEM — M51.369 LUMBAR DEGENERATIVE DISC DISEASE: Status: ACTIVE | Noted: 2019-03-13

## 2019-03-13 PROCEDURE — 99214 OFFICE O/P EST MOD 30 MIN: CPT | Performed by: FAMILY MEDICINE

## 2019-03-13 PROCEDURE — 90471 IMMUNIZATION ADMIN: CPT | Performed by: FAMILY MEDICINE

## 2019-03-13 PROCEDURE — 90715 TDAP VACCINE 7 YRS/> IM: CPT | Performed by: FAMILY MEDICINE

## 2019-03-13 SDOH — HEALTH STABILITY: MENTAL HEALTH: HOW MANY STANDARD DRINKS CONTAINING ALCOHOL DO YOU HAVE ON A TYPICAL DAY?: 1 OR 2

## 2019-04-04 DIAGNOSIS — D35.2 PITUITARY MICROADENOMA (HCC): ICD-10-CM

## 2019-04-04 DIAGNOSIS — R79.89 ELEVATED PROLACTIN LEVEL: ICD-10-CM

## 2019-04-04 RX ORDER — CABERGOLINE 0.5 MG/1
0.25 TABLET ORAL
Qty: 8 TABLET | Refills: 0 | Status: SHIPPED | OUTPATIENT
Start: 2019-04-04 | End: 2019-07-17 | Stop reason: SDUPTHER

## 2019-04-04 NOTE — TELEPHONE ENCOUNTER
Medication: cabergoline   Dose:0.5 mg   Sig: one by mouth twice weekly    Last refilled: 10/1/2018      Last seen: 1/30/2019  Follow up: none on file

## 2019-07-01 ENCOUNTER — OFFICE VISIT (OUTPATIENT)
Dept: FAMILY MEDICINE CLINIC | Age: 29
End: 2019-07-01
Payer: COMMERCIAL

## 2019-07-01 VITALS
OXYGEN SATURATION: 98 % | SYSTOLIC BLOOD PRESSURE: 118 MMHG | HEIGHT: 68 IN | HEART RATE: 66 BPM | WEIGHT: 167 LBS | DIASTOLIC BLOOD PRESSURE: 78 MMHG | BODY MASS INDEX: 25.31 KG/M2

## 2019-07-01 DIAGNOSIS — M54.50 ACUTE RIGHT-SIDED LOW BACK PAIN WITHOUT SCIATICA: Primary | ICD-10-CM

## 2019-07-01 DIAGNOSIS — H10.31 ACUTE CONJUNCTIVITIS OF RIGHT EYE, UNSPECIFIED ACUTE CONJUNCTIVITIS TYPE: ICD-10-CM

## 2019-07-01 DIAGNOSIS — Z30.09 VASECTOMY EVALUATION: ICD-10-CM

## 2019-07-01 PROCEDURE — 99213 OFFICE O/P EST LOW 20 MIN: CPT | Performed by: FAMILY MEDICINE

## 2019-07-10 ENCOUNTER — HOSPITAL ENCOUNTER (OUTPATIENT)
Dept: PHYSICAL THERAPY | Age: 29
Setting detail: THERAPIES SERIES
Discharge: HOME | End: 2019-07-10
Payer: COMMERCIAL

## 2019-07-10 PROCEDURE — 97161 PT EVAL LOW COMPLEX 20 MIN: CPT

## 2019-07-10 PROCEDURE — 97110 THERAPEUTIC EXERCISES: CPT

## 2019-07-10 NOTE — PLAN OF CARE
7/10                          [x] Patient history, allergies, meds reviewed. Medical chart reviewed. See intake form. 7/10     Review Of Systems (ROS):  [x]Performed Review of systems (Integumentary, CardioPulmonary, Neurological) by intake and observation. Intake form has been scanned into medical record. Patient has been instructed to contact their primary care physician regarding ROS issues if not already being addressed at this time. 7/10     Co-morbidities/Complexities (which will affect course of rehabilitation):   [x]None              Arthritic conditions   []Rheumatoid arthritis (M05.9)  []Osteoarthritis (M19.91)    Cardiovascular conditions   []Hypertension (I10)  []Hyperlipidemia (E78.5)  []Angina pectoris (I20)  []Atherosclerosis (I70)    Musculoskeletal conditions   []Disc pathology   []Congenital spine pathologies   []Prior surgical intervention  []Osteoporosis (M81.8)  []Osteopenia (M85.8)   Endocrine conditions   []Hypothyroid (E03.9)  []Hyperthyroid Gastrointestinal conditions   []Constipation (F97.76)    Metabolic conditions   []Morbid obesity (E66.01)  []Diabetes type 1(E10.65) or 2 (E11.65)   []Neuropathy (G60.9)      Pulmonary conditions   []Asthma (J45)  []Coughing   []COPD (J44.9)    Psychological Disorders  []Anxiety (F41.9)  []Depression (F32.9)   []Other:    []Other:            Barriers to/and or personal factors that will affect rehab potential:              [x]Age  [x]Sex              [x]Motivation/Lack of Motivation                        []Co-Morbidities              []Cognitive Function, education/learning barriers              []Environmental, home barriers              []profession/work barriers  []past PT/medical experience   []other:  Justification:      Falls Risk Assessment (30 days):   [x] Falls Risk assessed and no intervention required.   [] Falls Risk assessed and Patient requires intervention due to being higher risk   TUG score (>12s at risk):     [] Falls education

## 2019-07-16 DIAGNOSIS — D35.2 PITUITARY MICROADENOMA (HCC): ICD-10-CM

## 2019-07-16 DIAGNOSIS — R79.89 ELEVATED PROLACTIN LEVEL: ICD-10-CM

## 2019-07-16 LAB
A/G RATIO: 2.2 (ref 1.1–2.2)
ALBUMIN SERPL-MCNC: 4.8 G/DL (ref 3.4–5)
ALP BLD-CCNC: 67 U/L (ref 40–129)
ALT SERPL-CCNC: 26 U/L (ref 10–40)
ANION GAP SERPL CALCULATED.3IONS-SCNC: 13 MMOL/L (ref 3–16)
AST SERPL-CCNC: 16 U/L (ref 15–37)
BILIRUB SERPL-MCNC: 0.9 MG/DL (ref 0–1)
BUN BLDV-MCNC: 11 MG/DL (ref 7–20)
CALCIUM SERPL-MCNC: 9.8 MG/DL (ref 8.3–10.6)
CHLORIDE BLD-SCNC: 102 MMOL/L (ref 99–110)
CO2: 27 MMOL/L (ref 21–32)
CREAT SERPL-MCNC: 0.9 MG/DL (ref 0.9–1.3)
GFR AFRICAN AMERICAN: >60
GFR NON-AFRICAN AMERICAN: >60
GLOBULIN: 2.2 G/DL
GLUCOSE BLD-MCNC: 96 MG/DL (ref 70–99)
POTASSIUM SERPL-SCNC: 4.3 MMOL/L (ref 3.5–5.1)
PROLACTIN: 1.6 NG/ML
SODIUM BLD-SCNC: 142 MMOL/L (ref 136–145)
TOTAL PROTEIN: 7 G/DL (ref 6.4–8.2)

## 2019-07-17 ENCOUNTER — OFFICE VISIT (OUTPATIENT)
Dept: ENDOCRINOLOGY | Age: 29
End: 2019-07-17
Payer: COMMERCIAL

## 2019-07-17 ENCOUNTER — HOSPITAL ENCOUNTER (OUTPATIENT)
Dept: PHYSICAL THERAPY | Age: 29
Setting detail: THERAPIES SERIES
Discharge: HOME | End: 2019-07-17
Payer: COMMERCIAL

## 2019-07-17 VITALS
HEIGHT: 68 IN | HEART RATE: 70 BPM | BODY MASS INDEX: 25.46 KG/M2 | WEIGHT: 168 LBS | OXYGEN SATURATION: 97 % | DIASTOLIC BLOOD PRESSURE: 76 MMHG | SYSTOLIC BLOOD PRESSURE: 118 MMHG

## 2019-07-17 DIAGNOSIS — R79.89 ELEVATED PROLACTIN LEVEL: ICD-10-CM

## 2019-07-17 DIAGNOSIS — D35.2 PITUITARY MICROADENOMA (HCC): ICD-10-CM

## 2019-07-17 PROCEDURE — 99214 OFFICE O/P EST MOD 30 MIN: CPT | Performed by: INTERNAL MEDICINE

## 2019-07-17 PROCEDURE — 97110 THERAPEUTIC EXERCISES: CPT

## 2019-07-17 PROCEDURE — 97112 NEUROMUSCULAR REEDUCATION: CPT

## 2019-07-17 RX ORDER — CABERGOLINE 0.5 MG/1
0.25 TABLET ORAL
Qty: 8 TABLET | Refills: 2 | Status: SHIPPED | OUTPATIENT
Start: 2019-07-18 | End: 2020-08-17

## 2019-07-17 NOTE — PROGRESS NOTES
Marital status:      Spouse name: Not on file    Number of children: Not on file    Years of education: Not on file    Highest education level: Not on file   Occupational History    Occupation: [employer]:. Social Needs    Financial resource strain: Not on file    Food insecurity:     Worry: Not on file     Inability: Not on file    Transportation needs:     Medical: Not on file     Non-medical: Not on file   Tobacco Use    Smoking status: Never Smoker    Smokeless tobacco: Never Used   Substance and Sexual Activity    Alcohol use: Yes     Drinks per session: 1 or 2     Comment: 1 drink per day     Drug use: No    Sexual activity: Yes     Partners: Female     Comment: - 1 child   Lifestyle    Physical activity:     Days per week: Not on file     Minutes per session: Not on file    Stress: Not on file   Relationships    Social connections:     Talks on phone: Not on file     Gets together: Not on file     Attends Rastafarian service: Not on file     Active member of club or organization: Not on file     Attends meetings of clubs or organizations: Not on file     Relationship status: Not on file    Intimate partner violence:     Fear of current or ex partner: Not on file     Emotionally abused: Not on file     Physically abused: Not on file     Forced sexual activity: Not on file   Other Topics Concern    Not on file   Social History Narrative    Not on file         Past Medical History:   Diagnosis Date    Athlete's foot     Brain tumor ([facility] 04/2012    Per pt' was pituitary that has resolved.  Eye problem     Right:eye growth:under care of eye specialist:asymptomatic. No vision deficits.     Increased prolactin level (HCC)     Pituitary microadenoma ([facility] 12/1/2017         Past Surgical History:   Procedure Laterality Date    WISDOM TOOTH EXTRACTION         No Known Allergies      Current Outpatient Medications:     [START ON 7/18/2019] cabergoline (DOSTINEX) 0.5 MG

## 2019-07-17 NOTE — FLOWSHEET NOTE
The [facility] and [facility], [facility] [address] [facility], [address] [place] [address]  Phone: [phone]   Fax:     [phone]    Physical Therapy Daily Treatment Note  Date:  2019    Patient Name:  Luly Maddox    :  1990  MRN: [id number]  Restrictions/Precautions:    Medical/Treatment Diagnosis Information:  Diagnosis: M54.5 (ICD-10-CM) - Acute right-sided low back pain without sciatica  Treatment Diagnosis: M54.5 Pain in Lumbar region  Insurance/Certification information:  PT Insurance Information: Aetna  Physician Information:  Referring Practitioner: Radha Penny MD  Plan of care signed (Y/N):     Date of Patient follow up with Physician:     G-Code (if applicable):      Date G-Code Applied:  7/10/19   [place]: 12% deficit    Progress Note: []  Yes  []  No  Next due by: Visit #10      Latex Allergy:  [x]NO      []YES  Preferred Language for Healthcare:   [x]English       []other:    Visit # Insurance Allowable   2   UNL     Pain level:  0/10     SUBJECTIVE:   Patient states that he is doing pretty well. He states that he had a little bit of pain while he was holding one of his daughters but he had been holding her for about an hour.      OBJECTIVE:   ROM  7/10   Comments   Trunk flexion WNL + tightness present    Trunk extension 25% impaired + pain at end range    Trunk R sidebend WNL    Trunk L sidebend WNL    Trunk R rotation 25% deficit + tightness present    Trunk L rotation WNL    HS flexibility                        Strength  7/10 Left Right Comments   Hip flexion(L2) 5 5    Knee extension(L3) 5 5    Knee flexion(S1-2) 5 5    Ankle dorsiflexion(L4) 5 5    Toe extension(L5) 5 5    Ankle eversion/plantar flexion(S1) 5 5    Hip abd             Special tests  7/10   Comments   SLR      Slump test      Pelvic symmetry       Segmental Spinal mobility      Heel walk negative     Toe walk negative     Tandem walk negative

## 2019-07-24 ENCOUNTER — HOSPITAL ENCOUNTER (OUTPATIENT)
Dept: PHYSICAL THERAPY | Age: 29
Setting detail: THERAPIES SERIES
Discharge: HOME | End: 2019-07-24
Payer: COMMERCIAL

## 2019-07-24 PROCEDURE — 97112 NEUROMUSCULAR REEDUCATION: CPT

## 2019-07-24 PROCEDURE — 97110 THERAPEUTIC EXERCISES: CPT

## 2019-07-24 NOTE — FLOWSHEET NOTE
[address], [place] [facility] [address] [place] [address], [address] [place] [address]  Phone: [phone]   Fax:     [phone]    Physical Therapy Daily Treatment Note  Date:  2019    Patient Name:  Santino Castillo    :  1990  MRN: [id number]  Restrictions/Precautions:    Medical/Treatment Diagnosis Information:  Diagnosis: M54.5 (ICD-10-CM) - Acute right-sided low back pain without sciatica  Treatment Diagnosis: M54.5 Pain in Lumbar region  Insurance/Certification information:  PT Insurance Information: Aetna  Physician Information:  Referring Practitioner: Rianna Baker MD  Plan of care signed (Y/N):     Date of Patient follow up with Physician:     G-Code (if applicable):      Date G-Code Applied:  7/10/19   [place]: 12% deficit    Progress Note: []  Yes  []  No  Next due by: Visit #10      Latex Allergy:  [x]NO      []YES  Preferred Language for Healthcare:   [x]English       []other:    Visit # Insurance Allowable   3   UNL     Pain level:  0/10     SUBJECTIVE:   Patient states that his back is doing okay. He states that he has had some flare-ups that will last for 10-15 minutes while he is holding his daughter and go away quickly. He states that he can attribute the pain to poor posture.       OBJECTIVE:   ROM  /10   Comments   Trunk flexion WNL + tightness present    Trunk extension 25% impaired + pain at end range    Trunk R sidebend WNL    Trunk L sidebend WNL    Trunk R rotation 25% deficit + tightness present    Trunk L rotation WNL    HS flexibility                        Strength  7/10 Left Right Comments   Hip flexion(L2) 5 5    Knee extension(L3) 5 5    Knee flexion(S1-2) 5 5    Ankle dorsiflexion(L4) 5 5    Toe extension(L5) 5 5    Ankle eversion/plantar flexion(S1) 5 5    Hip abd             Special tests  7/10   Comments   SLR      Slump test      Pelvic symmetry       Segmental Spinal mobility      Heel walk negative

## 2019-10-31 ENCOUNTER — HOSPITAL ENCOUNTER (OUTPATIENT)
Dept: MRI IMAGING | Age: 29
Discharge: HOME | End: 2019-10-31
Payer: COMMERCIAL

## 2019-10-31 DIAGNOSIS — R79.89 ELEVATED PROLACTIN LEVEL: ICD-10-CM

## 2019-10-31 DIAGNOSIS — D35.2 PITUITARY MICROADENOMA (HCC): ICD-10-CM

## 2019-10-31 PROCEDURE — 2580000003 HC RX 258

## 2019-10-31 PROCEDURE — A9577 INJ MULTIHANCE: HCPCS

## 2019-10-31 PROCEDURE — 6360000004 HC RX CONTRAST MEDICATION

## 2019-10-31 PROCEDURE — 70553 MRI BRAIN STEM W/O & W/DYE: CPT

## 2019-10-31 RX ORDER — SODIUM CHLORIDE 0.9 % (FLUSH) 0.9 %
10 SYRINGE (ML) INJECTION ONCE
Status: COMPLETED | OUTPATIENT
Start: 2019-10-31 | End: 2019-10-31

## 2019-10-31 RX ADMIN — GADOBENATE DIMEGLUMINE 15 ML: 529 INJECTION, SOLUTION INTRAVENOUS at 08:26

## 2019-10-31 RX ADMIN — Medication 10 ML: at 08:26

## 2019-11-04 ENCOUNTER — NURSE ONLY (OUTPATIENT)
Dept: FAMILY MEDICINE CLINIC | Age: 29
End: 2019-11-04
Payer: COMMERCIAL

## 2019-11-04 DIAGNOSIS — Z23 NEED FOR INFLUENZA VACCINATION: Primary | ICD-10-CM

## 2019-11-04 PROCEDURE — 90471 IMMUNIZATION ADMIN: CPT | Performed by: NURSE PRACTITIONER

## 2019-11-04 PROCEDURE — 90686 IIV4 VACC NO PRSV 0.5 ML IM: CPT | Performed by: NURSE PRACTITIONER

## 2020-08-11 ENCOUNTER — OFFICE VISIT (OUTPATIENT)
Dept: PRIMARY CARE CLINIC | Age: 30
End: 2020-08-11
Payer: COMMERCIAL

## 2020-08-11 PROCEDURE — 99211 OFF/OP EST MAY X REQ PHY/QHP: CPT | Performed by: NURSE PRACTITIONER

## 2020-08-11 NOTE — PROGRESS NOTES
Lesvia Ray received a viral test for COVID-19. They were educated on isolation and quarantine as appropriate. For any symptoms, they were directed to seek care from their PCP, given contact information to establish with a doctor, directed to an urgent care or the emergency room.

## 2020-08-12 LAB
SARS-COV-2: NOT DETECTED
SOURCE: NORMAL

## 2020-08-17 ENCOUNTER — OFFICE VISIT (OUTPATIENT)
Dept: FAMILY MEDICINE CLINIC | Age: 30
End: 2020-08-17
Payer: COMMERCIAL

## 2020-08-17 VITALS
WEIGHT: 162 LBS | HEIGHT: 68 IN | DIASTOLIC BLOOD PRESSURE: 82 MMHG | TEMPERATURE: 96 F | OXYGEN SATURATION: 98 % | SYSTOLIC BLOOD PRESSURE: 118 MMHG | HEART RATE: 66 BPM | BODY MASS INDEX: 24.55 KG/M2

## 2020-08-17 PROBLEM — K64.9 HEMORRHOID: Chronic | Status: ACTIVE | Noted: 2019-03-13

## 2020-08-17 PROBLEM — R79.89 ELEVATED PROLACTIN LEVEL: Chronic | Status: ACTIVE | Noted: 2017-12-01

## 2020-08-17 PROBLEM — M51.369 LUMBAR DEGENERATIVE DISC DISEASE: Chronic | Status: ACTIVE | Noted: 2019-03-13

## 2020-08-17 PROBLEM — M51.36 LUMBAR DEGENERATIVE DISC DISEASE: Chronic | Status: ACTIVE | Noted: 2019-03-13

## 2020-08-17 PROBLEM — D35.2 PITUITARY MICROADENOMA (HCC): Chronic | Status: ACTIVE | Noted: 2017-12-01

## 2020-08-17 PROBLEM — N62 GYNECOMASTIA, MALE: Chronic | Status: ACTIVE | Noted: 2017-12-01

## 2020-08-17 PROCEDURE — 93000 ELECTROCARDIOGRAM COMPLETE: CPT | Performed by: FAMILY MEDICINE

## 2020-08-17 PROCEDURE — 99214 OFFICE O/P EST MOD 30 MIN: CPT | Performed by: FAMILY MEDICINE

## 2020-08-17 RX ORDER — OMEPRAZOLE 20 MG/1
20 CAPSULE, DELAYED RELEASE ORAL
Qty: 30 CAPSULE | Refills: 0 | Status: SHIPPED | OUTPATIENT
Start: 2020-08-17 | End: 2020-09-09

## 2020-08-17 ASSESSMENT — PATIENT HEALTH QUESTIONNAIRE - PHQ9
2. FEELING DOWN, DEPRESSED OR HOPELESS: 0
1. LITTLE INTEREST OR PLEASURE IN DOING THINGS: 0
SUM OF ALL RESPONSES TO PHQ QUESTIONS 1-9: 0
SUM OF ALL RESPONSES TO PHQ QUESTIONS 1-9: 0
SUM OF ALL RESPONSES TO PHQ9 QUESTIONS 1 & 2: 0

## 2020-08-17 ASSESSMENT — ENCOUNTER SYMPTOMS
SHORTNESS OF BREATH: 0
DIARRHEA: 0
RHINORRHEA: 0
SORE THROAT: 0
ABDOMINAL PAIN: 0
CHEST TIGHTNESS: 0
CONSTIPATION: 0

## 2020-08-17 NOTE — PATIENT INSTRUCTIONS
Waldemar Gambino,  It was a pleasure meeting you today. As discussed:  · Get your imaging done soon- you can call [phone] to schedule your imaging. · I have sent in the prescriptions as discussed to your pharmacy, you can call your pharmacy for all refill requests. · Please get your labs done at your earliest convenience-Fasting. We will call you within 48 hours with the results. Please do not hesitate to call or send a message if you have questions or concerns. Our goal is to ensure your complete wellbeing.    Enjoy the rest of the Week    Dr Scott Hawkins

## 2020-08-17 NOTE — PROGRESS NOTES
palpitations and leg swelling. Gastrointestinal: Negative for abdominal pain, constipation and diarrhea. Genitourinary: Negative for dysuria and frequency. Musculoskeletal: Negative for arthralgias. Neurological: Negative for dizziness, weakness and headaches. Psychiatric/Behavioral: Negative for decreased concentration, hallucinations and sleep disturbance. The patient is not nervous/anxious. All other systems reviewed and are negative. ROS above this line reviewed by Provider. Objective:   /82 (Site: Left Upper Arm, Position: Sitting, Cuff Size: Medium Adult)   Pulse 66   Temp 96 °F (35.6 °C) (Infrared)   Ht 5' 8\" (1.727 m)   Wt 162 lb (73.5 kg)   SpO2 98%   BMI 24.63 kg/m²   Physical Exam  Vitals signs and nursing note reviewed. Constitutional:       General: He is not in acute distress. Appearance: Normal appearance. He is normal weight. He is not ill-appearing, toxic-appearing or diaphoretic. HENT:      Head: Normocephalic and atraumatic. Eyes:      General: No scleral icterus. Conjunctiva/sclera: Conjunctivae normal.   Neck:      Musculoskeletal: Normal range of motion. Cardiovascular:      Rate and Rhythm: Normal rate and regular rhythm. Heart sounds: Normal heart sounds. No murmur. No friction rub. No gallop. Pulmonary:      Effort: Pulmonary effort is normal. No respiratory distress. Breath sounds: Normal breath sounds. No stridor. No wheezing, rhonchi or rales. Chest:      Chest wall: No tenderness. Abdominal:      General: Abdomen is flat. Bowel sounds are normal. There is no distension. Palpations: Abdomen is soft. Tenderness: There is abdominal tenderness (epigastric ). Skin:     General: Skin is warm and dry. Neurological:      Mental Status: He is alert. Psychiatric:         Mood and Affect: Mood normal.         Behavior: Behavior normal.       Assessment and Plan:   1.  Other chest pain  Atypical, non reproducible on

## 2020-08-18 DIAGNOSIS — Z13.220 LIPID SCREENING: ICD-10-CM

## 2020-08-18 DIAGNOSIS — R07.89 OTHER CHEST PAIN: ICD-10-CM

## 2020-08-18 LAB
BASOPHILS ABSOLUTE: 0.1 K/UL (ref 0–0.2)
BASOPHILS RELATIVE PERCENT: 1 %
EOSINOPHILS ABSOLUTE: 0.2 K/UL (ref 0–0.6)
EOSINOPHILS RELATIVE PERCENT: 3.1 %
HCT VFR BLD CALC: 48.3 % (ref 40.5–52.5)
HEMOGLOBIN: 16.3 G/DL (ref 13.5–17.5)
LYMPHOCYTES ABSOLUTE: 2.3 K/UL (ref 1–5.1)
LYMPHOCYTES RELATIVE PERCENT: 31.3 %
MCH RBC QN AUTO: 30.8 PG (ref 26–34)
MCHC RBC AUTO-ENTMCNC: 33.7 G/DL (ref 31–36)
MCV RBC AUTO: 91.3 FL (ref 80–100)
MONOCYTES ABSOLUTE: 0.5 K/UL (ref 0–1.3)
MONOCYTES RELATIVE PERCENT: 6.9 %
NEUTROPHILS ABSOLUTE: 4.3 K/UL (ref 1.7–7.7)
NEUTROPHILS RELATIVE PERCENT: 57.7 %
PDW BLD-RTO: 12.9 % (ref 12.4–15.4)
PLATELET # BLD: 295 K/UL (ref 135–450)
PMV BLD AUTO: 8.5 FL (ref 5–10.5)
RBC # BLD: 5.29 M/UL (ref 4.2–5.9)
WBC # BLD: 7.5 K/UL (ref 4–11)

## 2020-08-19 LAB
CHOLESTEROL, TOTAL: 160 MG/DL (ref 0–199)
HDLC SERPL-MCNC: 52 MG/DL (ref 40–60)
LDL CHOLESTEROL CALCULATED: 88 MG/DL
TRIGL SERPL-MCNC: 99 MG/DL (ref 0–150)
TSH REFLEX FT4: 0.44 UIU/ML (ref 0.27–4.2)
VLDLC SERPL CALC-MCNC: 20 MG/DL

## 2020-08-21 ENCOUNTER — HOSPITAL ENCOUNTER (OUTPATIENT)
Dept: NON INVASIVE DIAGNOSTICS | Age: 30
Discharge: HOME | End: 2020-08-21
Payer: COMMERCIAL

## 2020-08-21 PROCEDURE — 93017 CV STRESS TEST TRACING ONLY: CPT

## 2020-09-09 RX ORDER — OMEPRAZOLE 20 MG/1
CAPSULE, DELAYED RELEASE ORAL
Qty: 30 CAPSULE | Refills: 0 | Status: SHIPPED | OUTPATIENT
Start: 2020-09-09 | End: 2020-10-05 | Stop reason: SDUPTHER

## 2020-10-05 RX ORDER — OMEPRAZOLE 20 MG/1
CAPSULE, DELAYED RELEASE ORAL
Qty: 90 CAPSULE | Refills: 0 | Status: SHIPPED | OUTPATIENT
Start: 2020-10-05 | End: 2020-10-21

## 2020-10-13 ENCOUNTER — OFFICE VISIT (OUTPATIENT)
Dept: FAMILY MEDICINE CLINIC | Age: 30
End: 2020-10-13
Payer: COMMERCIAL

## 2020-10-13 VITALS
SYSTOLIC BLOOD PRESSURE: 118 MMHG | BODY MASS INDEX: 25.01 KG/M2 | OXYGEN SATURATION: 99 % | HEART RATE: 72 BPM | DIASTOLIC BLOOD PRESSURE: 68 MMHG | TEMPERATURE: 98.1 F | WEIGHT: 165 LBS | HEIGHT: 68 IN

## 2020-10-13 DIAGNOSIS — D35.2 PITUITARY MICROADENOMA (HCC): Chronic | ICD-10-CM

## 2020-10-13 LAB
A/G RATIO: 2.2 (ref 1.1–2.2)
ALBUMIN SERPL-MCNC: 4.8 G/DL (ref 3.4–5)
ALP BLD-CCNC: 72 U/L (ref 40–129)
ALT SERPL-CCNC: 21 U/L (ref 10–40)
ANION GAP SERPL CALCULATED.3IONS-SCNC: 11 MMOL/L (ref 3–16)
AST SERPL-CCNC: 15 U/L (ref 15–37)
BILIRUB SERPL-MCNC: 0.9 MG/DL (ref 0–1)
BUN BLDV-MCNC: 11 MG/DL (ref 7–20)
CALCIUM SERPL-MCNC: 9.7 MG/DL (ref 8.3–10.6)
CHLORIDE BLD-SCNC: 103 MMOL/L (ref 99–110)
CO2: 27 MMOL/L (ref 21–32)
CREAT SERPL-MCNC: 0.8 MG/DL (ref 0.9–1.3)
GFR AFRICAN AMERICAN: >60
GFR NON-AFRICAN AMERICAN: >60
GLOBULIN: 2.2 G/DL
GLUCOSE BLD-MCNC: 86 MG/DL (ref 70–99)
POTASSIUM SERPL-SCNC: 4.3 MMOL/L (ref 3.5–5.1)
PROLACTIN: 24.4 NG/ML
SODIUM BLD-SCNC: 141 MMOL/L (ref 136–145)
TOTAL PROTEIN: 7 G/DL (ref 6.4–8.2)
TSH REFLEX: 0.52 UIU/ML (ref 0.27–4.2)

## 2020-10-13 PROCEDURE — 99213 OFFICE O/P EST LOW 20 MIN: CPT | Performed by: FAMILY MEDICINE

## 2020-10-13 PROCEDURE — 90686 IIV4 VACC NO PRSV 0.5 ML IM: CPT | Performed by: FAMILY MEDICINE

## 2020-10-13 PROCEDURE — 90471 IMMUNIZATION ADMIN: CPT | Performed by: FAMILY MEDICINE

## 2020-10-13 NOTE — PROGRESS NOTES
Subjective:      Patient ID: Kyrie Schaefer is a 30 y.o. male. HPI   Pt is a of 30 y.o. male comes in today with   Chief Complaint   Patient presents with    Orders     Patients mother in law recently passed away from cancer, wants checked.  Mole     Multiple moles he would like examined.  Flu Vaccine     Overdue for follow up with endo for prolactinoma  4-5 months of nasal congestion/PND  Some moles he wants checked    Past Medical History:Reviewed  Medications:Reviewed. No Known Allergies   Social hx:Reviewed. Social History     Tobacco Use   Smoking Status Never Smoker   Smokeless Tobacco Never Used       Review of Systems    Objective:   Physical Exam  Constitutional:       Appearance: Normal appearance. HENT:      Nose: No nasal deformity. Right Turbinates: Enlarged and swollen. Left Turbinates: Enlarged and swollen. Cardiovascular:      Rate and Rhythm: Normal rate and regular rhythm. Pulses: Normal pulses. Heart sounds: No murmur. Pulmonary:      Effort: Pulmonary effort is normal.      Breath sounds: Normal breath sounds. Abdominal:      General: Abdomen is flat. Palpations: Abdomen is soft. There is no hepatomegaly, splenomegaly or mass. Skin:     Capillary Refill: Capillary refill takes less than 2 seconds. Neurological:      Mental Status: He is alert. Psychiatric:         Mood and Affect: Mood normal.         Assessment:       Diagnosis Orders   1. Pituitary microadenoma (HCC)  Comprehensive Metabolic Panel    PROLACTIN    TSH with Reflex   2. Flu vaccine need  INFLUENZA, QUADV, 3 YRS AND OLDER, IM PF, PREFILL SYR OR SDV, 0.5ML (AFLURIA QUADV, PF)   3. Compound nevus            Plan:      Joy Byrd was seen today for orders, mole and flu vaccine. Diagnoses and all orders for this visit:    Pituitary microadenoma [facility])  -     Comprehensive Metabolic Panel; Future  -     PROLACTIN; Future  -     TSH with Reflex;  Future  Due for bloodwork to reassess  Flu vaccine need  -     INFLUENZA, QUADV, 3 YRS AND OLDER, IM PF, PREFILL SYR OR SDV, 0.5ML (AFLURIA QUADV, PF)    Compound nevus  Benign appearance.   If changes then recheck or follow up with shelley Castillo MD

## 2020-10-13 NOTE — PATIENT INSTRUCTIONS
f=  Patient Education        Influenza (Flu) Vaccine (Inactivated or Recombinant): What You Need to Know  Why get vaccinated? Influenza vaccine can prevent influenza (flu). Flu is a contagious disease that spreads around the [place] every year, usually between October and May. Anyone can get the flu, but it is more dangerous for some people. Infants and young children, people 72years of age and older, pregnant women, and people with certain health conditions or a weakened immune system are at greatest risk of flu complications. Pneumonia, bronchitis, sinus infections and ear infections are examples of flu-related complications. If you have a medical condition, such as heart disease, cancer or diabetes, flu can make it worse. Flu can cause fever and chills, sore throat, muscle aches, fatigue, cough, headache, and runny or stuffy nose. Some people may have vomiting and diarrhea, though this is more common in children than adults. Each year, thousands of people in the [facility] die from flu, and many more are hospitalized. Flu vaccine prevents millions of illnesses and flu-related visits to the doctor each year. Influenza vaccine  CDC recommends everyone 10months of age and older get vaccinated every flu season. Children 6 months through 6years of age may need 2 doses during a single flu season. Everyone else needs only 1 dose each flu season. It takes about 2 weeks for protection to develop after vaccination. There are many flu viruses, and they are always changing. Each year a new flu vaccine is made to protect against three or four viruses that are likely to cause disease in the upcoming flu season. Even when the vaccine doesn't exactly match these viruses, it may still provide some protection. Influenza vaccine does not cause flu. Influenza vaccine may be given at the same time as other vaccines.   Talk with your health care provider  Tell your vaccine provider if the person getting the vaccine:  · Has had an allergic reaction after a previous dose of influenza vaccine, or has any severe, life-threatening allergies. · Has ever had Guillain-Barré Syndrome (also called GBS). In some cases, your health care provider may decide to postpone influenza vaccination to a future visit. People with minor illnesses, such as a cold, may be vaccinated. People who are moderately or severely ill should usually wait until they recover before getting influenza vaccine. Your health care provider can give you more information. Risks of a vaccine reaction  · Soreness, redness, and swelling where shot is given, fever, muscle aches, and headache can happen after influenza vaccine. · There may be a very small increased risk of Guillain-Barré Syndrome (GBS) after inactivated influenza vaccine (the flu shot). [facility] children who get the flu shot along with pneumococcal vaccine (PCV13), and/or DTaP vaccine at the same time might be slightly more likely to have a seizure caused by fever. Tell your health care provider if a child who is getting flu vaccine has ever had a seizure. People sometimes faint after medical procedures, including vaccination. Tell your provider if you feel dizzy or have vision changes or ringing in the ears. As with any medicine, there is a very remote chance of a vaccine causing a severe allergic reaction, other serious injury, or death. What if there is a serious problem? An allergic reaction could occur after the vaccinated person leaves the clinic. If you see signs of a severe allergic reaction (hives, swelling of the face and throat, difficulty breathing, a fast heartbeat, dizziness, or weakness), call 9-1-1 and get the person to the nearest hospital.  For other signs that concern you, call your health care provider. Adverse reactions should be reported to the Vaccine Adverse Event Reporting System (VAERS).  Your health care provider will usually file this report, or you can do it yourself. Visit the VAERS website at www.vaers. hhs.gov or call 2-679.787.4146. VAERS is only for reporting reactions, and VAERS staff do not give medical advice. The National Vaccine Injury Compensation Program  The National Vaccine Injury Compensation Program (VICP) is a federal program that was created to compensate people who may have been injured by certain vaccines. Visit the VICP website at www.[facility].gov/vaccinecompensation or call 7-106.875.5124 to learn about the program and about filing a claim. There is a time limit to file a claim for compensation. How can I learn more? · Ask your healthcare provider. · Call your local or state health department. · Contact the Centers for Disease Control and Prevention (CDC):  ? Call 8-307.598.9377 (1-800-CDC-INFO) or  ? Visit CDC's website at www.cdc.gov/flu  Vaccine Information Statement (Interim)  Inactivated Influenza Vaccine  8/15/2019  [id number]  Department of Health and Human Services  Centers for Disease Control and Prevention  Many Vaccine Information Statements are available in [place] and other languages. See www.immunize.org/vis. Muchas hojas de información sobre vacunas están disponibles en español y en otros idiomas. Visite www.immunize.org/vis. Care instructions adapted under license by [facility] ([facility]). If you have questions about a medical condition or this instruction, always ask your healthcare professional. [place] [address] any warranty or liability for your use of this information.

## 2020-10-13 NOTE — PROGRESS NOTES
Vaccine Information Sheet, \"Influenza - Inactivated\"  given to Gerardo Feliz, or parent/legal guardian of  Gerardo Feliz and verbalized understanding. Patient responses:    Have you ever had a reaction to a flu vaccine? No  Do you have any current illness? No  Have you ever had Guillian Guion Syndrome? No  Do you have a serious allergy to any of the follow: Neomycin, Polymyxin, Thimerosal, eggs or egg products? No    Flu vaccine given per order. Please see immunization tab. Risks and benefits explained. Current VIS given.

## 2020-10-21 ENCOUNTER — OFFICE VISIT (OUTPATIENT)
Dept: ENDOCRINOLOGY | Age: 30
End: 2020-10-21
Payer: COMMERCIAL

## 2020-10-21 VITALS
HEART RATE: 80 BPM | OXYGEN SATURATION: 100 % | SYSTOLIC BLOOD PRESSURE: 118 MMHG | WEIGHT: 165.2 LBS | BODY MASS INDEX: 25.04 KG/M2 | HEIGHT: 68 IN | DIASTOLIC BLOOD PRESSURE: 82 MMHG | TEMPERATURE: 97.9 F

## 2020-10-21 PROCEDURE — 99214 OFFICE O/P EST MOD 30 MIN: CPT | Performed by: INTERNAL MEDICINE

## 2020-10-21 RX ORDER — CABERGOLINE 0.5 MG/1
0.25 TABLET ORAL
Qty: 8 TABLET | Refills: 3 | Status: SHIPPED | OUTPATIENT
Start: 2020-10-22 | End: 2021-04-26 | Stop reason: SDUPTHER

## 2020-10-21 NOTE — PROGRESS NOTES
Patient ID:   Carlene Bae is a 30 y.o. male      Chief Complaint:   Carlene Bae is seen for an evaluation of elevated prolactin levels. Subjective:   He was Prolactin 52.9 in 2012, highest 102 in 2014 and then 3.9 in Sep 2014. MRI in  showed 6mm pituitary adenoma which resolved in  (only stalk deviation seen). W up was negative for Gh, cortisol and thyroid hormone in . Prolactin 64.8, , Free T slightly low at 36.8, TSH normal in Sep 2017   MRI brain in Oct 2017 showed right sided pituitary esion of 7x5mm hypoenhancing, deviating the infundubulun to left. Optic chiasm normal.     Mammogram and US breast showed gynecomastia in Oct 2017. Slightly more on left than right. MRI Oct 2018: 3mm pituitary tumor     MRI Oct 2019: stable inferior pituitary lesion     Prolactin 49 (in house) , 27.4 ( [facility]) Dec 2017   2018 2.5   2018 Prolactin 1.2    Last seen in 2019    Prolactin 24.4 Oct 2020     Interval history of mother in law  from liver cancer. Cabergoline 0.25mg once weekly - out for about a year   Bromocriptine caused lethargy. Then switched to cabergoline and stopped in . Testoterone levels normalized on treatment. No Nipple discharge and breast tenderness. Libido good, performance good, have morning erections now.     Energy levels good, warmer usually as compared to his wife   Regular day to day stress   Have a 3year old daughter and twin girls      Not on antipsychotics, SSRIs, estrogen supplements, antiemetics, Ca channel blockers, methyldopa, opioids  Denies drugs (heroine)     No family history of hyperprolactinemia or any endocrine tumors     The following portions of the patient's history were reviewed and updated as appropriate:       Family History   Problem Relation Age of Onset    No Known Problems Mother     Heart Disease Father     Mult Sclerosis Father     Diabetes Maternal Grandmother     Cancer Paternal Grandmother         leukemia    No Known Problems Paternal Grandfather     No Known Problems Sister     No Known Problems Brother          Social History     Socioeconomic History    Marital status:      Spouse name: Not on file    Number of children: Not on file    Years of education: Not on file    Highest education level: Not on file   Occupational History    Occupation: [employer]:. Social Needs    Financial resource strain: Not on file    Food insecurity     Worry: Not on file     Inability: Not on file    Transportation needs     Medical: Not on file     Non-medical: Not on file   Tobacco Use    Smoking status: Never Smoker    Smokeless tobacco: Never Used   Substance and Sexual Activity    Alcohol use: Yes     Drinks per session: 1 or 2     Comment: 1 drink per day     Drug use: No    Sexual activity: Yes     Partners: Female     Comment: - 1 child   Lifestyle    Physical activity     Days per week: Not on file     Minutes per session: Not on file    Stress: Not on file   Relationships    Social connections     Talks on phone: Not on file     Gets together: Not on file     Attends [employer] service: Not on file     Active member of club or organization: Not on file     Attends meetings of clubs or organizations: Not on file     Relationship status: Not on file    Intimate partner violence     Fear of current or ex partner: Not on file     Emotionally abused: Not on file     Physically abused: Not on file     Forced sexual activity: Not on file   Other Topics Concern    Not on file   Social History Narrative    Not on file         Past Medical History:   Diagnosis Date    Athlete's foot     Brain tumor ([address] 04/2012    Per pt' was pituitary that has resolved.  Eye problem     Right:eye growth:under care of eye specialist:asymptomatic. No vision deficits.     Increased prolactin level     Pituitary microadenoma ([address] 12/1/2017         Past Surgical History: Procedure Laterality Date    WISDOM TOOTH EXTRACTION         No Known Allergies      Current Outpatient Medications:     [START ON 10/22/2020] cabergoline (DOSTINEX) 0.5 MG tablet, Take 0.5 tablets by mouth Twice a Week, Disp: 8 tablet, Rfl: 3    Review of Systems:  Constitutional: Negative for fever, chills, and unexpected weight change. HENT: Negative for congestion, ear pain, rhinorrhea,  sore throat and trouble swallowing. Eyes: Negative for photophobia, redness, itching. Respiratory: Negative for cough, shortness of breath and sputum. Cardiovascular: Negative for chest pain, palpitations and leg swelling. Gastrointestinal: Negative for nausea, vomiting, abdominal pain, diarrhea, constipation. Endocrine: Negative for cold intolerance, heat intolerance, polydipsia, polyphagia and polyuria. Genitourinary: Negative for dysuria, urgency, frequency, hematuria and flank pain. Musculoskeletal: Negative for myalgias, back pain, arthralgias and neck pain. Skin/Nail: Negative for rash, itching. Normal nails. Neurological: Negative for seizures, weakness, light-headedness, numbness and headaches. Hematological/ Lymph nodes: Negative for adenopathy. Does not bruise/bleed easily. Psychiatric/Behavioral: Negative for suicidal ideas, depression, anxiety, sleep disturbance and decreased concentration. Objective:   Physical Exam:  /82 (Site: Right Upper Arm, Position: Sitting, Cuff Size: Medium Adult)   Pulse 80   Temp 97.9 °F (36.6 °C) (Temporal)   Ht 5' 8\" (1.727 m)   Wt 165 lb 3.2 oz (74.9 kg)   SpO2 100%   BMI 25.12 kg/m²   Constitutional: Patient is oriented to person, place, and time. Patient appears well-developed and well-nourished. HENT:    Head: Normocephalic and atraumatic. Eyes: Conjunctivae and EOM are normal.      Neck: Normal range of motion. Thyroid exam normal   Cardiovascular: Normal rate, regular rhythm and normal heart sounds.     Pulmonary/Chest: Effort normal and breath sounds normal.    Abdominal: Soft. Bowel sounds are normal.   Musculoskeletal: Normal range of motion. _  Neurological: Patient is alert and oriented to person, place, and time. Patient has slow reflexes. Skin: Skin is warm and dry. Psychiatric: Patient has a normal mood and affect. Patient behavior is normal.     Lab Review:    Orders Only on 10/13/2020   Component Date Value Ref Range Status    TSH 10/13/2020 0.52  0.27 - 4.20 uIU/mL Final    Prolactin 10/13/2020 24.4  ng/mL Final    Sodium 10/13/2020 141  136 - 145 mmol/L Final    Potassium 10/13/2020 4.3  3.5 - 5.1 mmol/L Final    Chloride 10/13/2020 103  99 - 110 mmol/L Final    CO2 10/13/2020 27  21 - 32 mmol/L Final    Anion Gap 10/13/2020 11  3 - 16 Final    Glucose 10/13/2020 86  70 - 99 mg/dL Final    BUN 10/13/2020 11  7 - 20 mg/dL Final    CREATININE 10/13/2020 0.8* 0.9 - 1.3 mg/dL Final    GFR Non- 10/13/2020 >60  >60 Final    GFR  10/13/2020 >60  >60 Final    Calcium 10/13/2020 9.7  8.3 - 10.6 mg/dL Final    Total Protein 10/13/2020 7.0  6.4 - 8.2 g/dL Final    Alb 10/13/2020 4.8  3.4 - 5.0 g/dL Final    Albumin/Globulin Ratio 10/13/2020 2.2  1.1 - 2.2 Final    Total Bilirubin 10/13/2020 0.9  0.0 - 1.0 mg/dL Final    Alkaline Phosphatase 10/13/2020 72  40 - 129 U/L Final    ALT 10/13/2020 21  10 - 40 U/L Final    AST 10/13/2020 15  15 - 37 U/L Final    Globulin 10/13/2020 2.2  g/dL Final   Orders Only on 08/18/2020   Component Date Value Ref Range Status    TSH Reflex FT4 08/18/2020 0.44  0.27 - 4.20 uIU/mL Final    WBC 08/18/2020 7.5  4.0 - 11.0 K/uL Final    RBC 08/18/2020 5.29  4. 20 - 5.90 M/uL Final    Hemoglobin 08/18/2020 16.3  13.5 - 17.5 g/dL Final    Hematocrit 08/18/2020 48.3  40.5 - 52.5 % Final    MCV 08/18/2020 91.3  80.0 - 100.0 fL Final    MCH 08/18/2020 30.8  26.0 - 34.0 pg Final    MCHC 08/18/2020 33.7  31.0 - 36.0 g/dL Final    RDW 08/18/2020 12.9  12.4 - 15.4 % Final    Platelets 64/03/1139 295  135 - 450 K/uL Final    MPV 08/18/2020 8.5  5.0 - 10.5 fL Final    Neutrophils % 08/18/2020 57.7  % Final    Lymphocytes % 08/18/2020 31.3  % Final    Monocytes % 08/18/2020 6.9  % Final    Eosinophils % 08/18/2020 3.1  % Final    Basophils % 08/18/2020 1.0  % Final    Neutrophils Absolute 08/18/2020 4.3  1.7 - 7.7 K/uL Final    Lymphocytes Absolute 08/18/2020 2.3  1.0 - 5.1 K/uL Final    Monocytes Absolute 08/18/2020 0.5  0.0 - 1.3 K/uL Final    Eosinophils Absolute 08/18/2020 0.2  0.0 - 0.6 K/uL Final    Basophils Absolute 08/18/2020 0.1  0.0 - 0.2 K/uL Final    Cholesterol, Total 08/18/2020 160  0 - 199 mg/dL Final    Triglycerides 08/18/2020 99  0 - 150 mg/dL Final    HDL 08/18/2020 52  40 - 60 mg/dL Final    LDL Calculated 08/18/2020 88  <100 mg/dL Final    VLDL Cholesterol Calculated 08/18/2020 20  Not Established mg/dL Final   Office Visit on 08/11/2020   Component Date Value Ref Range Status    SARS-CoV-2 08/11/2020 Not Detected  Not Detected Final    Source 08/11/2020 NP swab   Final           Assessment and Plan     Joyce Zaragoza was seen today for follow-up. Diagnoses and all orders for this visit:    Pituitary microadenoma ([facility] [address]  -     cabergoline (DOSTINEX) 0.5 MG tablet; Take 0.5 tablets by mouth Twice a Week  -     Comprehensive Metabolic Panel; Standing  -     Prolactin; Standing    Elevated prolactin level  -     cabergoline (DOSTINEX) 0.5 MG tablet; Take 0.5 tablets by mouth Twice a Week  -     Comprehensive Metabolic Panel; Standing  -     Prolactin; Standing        1: Hyperprolactinemia   Microprolactinoma     Prolactin 24 Oct 2020     Restart cabergoline 0.25mg twice a week. No murmur today     If prolactin is suppressed in 3 months, will change cabergoline to once a week     2: Pituitary microadenoma   MRI in Oct 2018: questionable 3mm hypo enhancement within post-inferior imaging only on sagittal imaging, not on coronal imaging. Next MRI in summer 2021. Plan was to repeat this year but he is out of meds.      Prolactin, CMP in 3 months and in 6 months and May Corral MD

## 2020-12-04 ENCOUNTER — OFFICE VISIT (OUTPATIENT)
Dept: PRIMARY CARE CLINIC | Age: 30
End: 2020-12-04
Payer: COMMERCIAL

## 2020-12-04 PROCEDURE — 99211 OFF/OP EST MAY X REQ PHY/QHP: CPT | Performed by: NURSE PRACTITIONER

## 2020-12-04 NOTE — PROGRESS NOTES
Saige Malloy received a viral test for COVID-19. They were educated on isolation and quarantine as appropriate. For any symptoms, they were directed to seek care from their PCP, given contact information to establish with a doctor, directed to an urgent care or the emergency room.

## 2020-12-06 LAB — SARS-COV-2, NAA: NOT DETECTED

## 2021-02-02 ENCOUNTER — OFFICE VISIT (OUTPATIENT)
Dept: FAMILY MEDICINE CLINIC | Age: 31
End: 2021-02-02
Payer: COMMERCIAL

## 2021-02-02 VITALS
BODY MASS INDEX: 25.61 KG/M2 | SYSTOLIC BLOOD PRESSURE: 118 MMHG | OXYGEN SATURATION: 98 % | WEIGHT: 169 LBS | HEART RATE: 80 BPM | TEMPERATURE: 98.1 F | DIASTOLIC BLOOD PRESSURE: 78 MMHG | HEIGHT: 68 IN

## 2021-02-02 DIAGNOSIS — F98.8 ATTENTION DEFICIT DISORDER (ADD) WITHOUT HYPERACTIVITY: Primary | ICD-10-CM

## 2021-02-02 PROCEDURE — 99213 OFFICE O/P EST LOW 20 MIN: CPT | Performed by: FAMILY MEDICINE

## 2021-02-02 RX ORDER — DEXTROAMPHETAMINE SACCHARATE, AMPHETAMINE ASPARTATE MONOHYDRATE, DEXTROAMPHETAMINE SULFATE AND AMPHETAMINE SULFATE 5; 5; 5; 5 MG/1; MG/1; MG/1; MG/1
20 CAPSULE, EXTENDED RELEASE ORAL EVERY MORNING
Qty: 30 CAPSULE | Refills: 0 | Status: SHIPPED | OUTPATIENT
Start: 2021-02-02 | End: 2021-10-01

## 2021-02-02 ASSESSMENT — PATIENT HEALTH QUESTIONNAIRE - PHQ9
SUM OF ALL RESPONSES TO PHQ QUESTIONS 1-9: 0
1. LITTLE INTEREST OR PLEASURE IN DOING THINGS: 0
SUM OF ALL RESPONSES TO PHQ QUESTIONS 1-9: 0
2. FEELING DOWN, DEPRESSED OR HOPELESS: 0

## 2021-02-02 NOTE — PROGRESS NOTES
Madelin Crenshaw (:  1990) is a 26 y.o. male,Established patient, here for evaluation of the following chief complaint(s):  No chief complaint on file. ASSESSMENT/PLAN:  1. Attention deficit disorder (ADD) without hyperactivity  -     amphetamine-dextroamphetamine (ADDERALL XR) 20 MG extended release capsule; Take 1 capsule by mouth every morning for 30 days. , Disp-30 capsule, R-0Normal  no longer able to compensate w/o med. Ok to resume adderall. Side effects of stimulants were reviewed including headache, insomnia, and weight loss. Rare incidence of CV events also emphasized. No follow-ups on file. SUBJECTIVE/OBJECTIVE:  HPI   Pt is a of 26 y.o. male comes in today with   Chief Complaint   Patient presents with    Other     Patient states he has been having difficulty focusing and staying on task. Treated for ADHD as a child. Problems with difficulty and concentrating. Was on adderall in middle and high school  Symptoms present since grade school  Had been able to cope and maintain performance until recently. Spending a lot of time checking and reworking things  Less ability to multitask. Less free time. Applied to get [facility] recently as well. Vitals:    21 1002   BP: 118/78   Site: Right Upper Arm   Position: Sitting   Cuff Size: Medium Adult   Pulse: 80   Temp: 98.1 °F (36.7 °C)   TempSrc: Temporal   SpO2: 98%   Weight: 169 lb (76.7 kg)   Height: 5' 8\" (1.727 m)      Review of Systems   Constitutional: Negative. Psychiatric/Behavioral: Negative for dysphoric mood. Physical Exam            An electronic signature was used to authenticate this note.     --Hunter Bonner MD

## 2021-04-19 DIAGNOSIS — D35.2 PITUITARY MICROADENOMA (HCC): Chronic | ICD-10-CM

## 2021-04-19 DIAGNOSIS — R79.89 ELEVATED PROLACTIN LEVEL: Chronic | ICD-10-CM

## 2021-04-19 LAB
A/G RATIO: 2.1 (ref 1.1–2.2)
ALBUMIN SERPL-MCNC: 4.6 G/DL (ref 3.4–5)
ALP BLD-CCNC: 73 U/L (ref 40–129)
ALT SERPL-CCNC: 20 U/L (ref 10–40)
ANION GAP SERPL CALCULATED.3IONS-SCNC: 9 MMOL/L (ref 3–16)
AST SERPL-CCNC: 18 U/L (ref 15–37)
BILIRUB SERPL-MCNC: 1.1 MG/DL (ref 0–1)
BUN BLDV-MCNC: 10 MG/DL (ref 7–20)
CALCIUM SERPL-MCNC: 9.2 MG/DL (ref 8.3–10.6)
CHLORIDE BLD-SCNC: 102 MMOL/L (ref 99–110)
CO2: 28 MMOL/L (ref 21–32)
CREAT SERPL-MCNC: 0.9 MG/DL (ref 0.9–1.3)
GFR AFRICAN AMERICAN: >60
GFR NON-AFRICAN AMERICAN: >60
GLOBULIN: 2.2 G/DL
GLUCOSE BLD-MCNC: 92 MG/DL (ref 70–99)
POTASSIUM SERPL-SCNC: 4.2 MMOL/L (ref 3.5–5.1)
PROLACTIN: 1.1 NG/ML
SODIUM BLD-SCNC: 139 MMOL/L (ref 136–145)
TOTAL PROTEIN: 6.8 G/DL (ref 6.4–8.2)

## 2021-04-21 PROBLEM — E22.1 HYPERPROLACTINEMIA (HCC): Status: ACTIVE | Noted: 2021-04-21

## 2021-04-21 PROBLEM — M25.569 PAIN IN JOINT, LOWER LEG: Status: ACTIVE | Noted: 2021-04-21

## 2021-04-21 PROBLEM — Z02.89 HEALTH EXAMINATION OF DEFINED SUBPOPULATION: Status: ACTIVE | Noted: 2021-04-21

## 2021-04-22 ENCOUNTER — OFFICE VISIT (OUTPATIENT)
Dept: FAMILY MEDICINE CLINIC | Age: 31
End: 2021-04-22
Payer: COMMERCIAL

## 2021-04-22 VITALS
OXYGEN SATURATION: 97 % | BODY MASS INDEX: 25.61 KG/M2 | HEIGHT: 68 IN | HEART RATE: 76 BPM | DIASTOLIC BLOOD PRESSURE: 76 MMHG | SYSTOLIC BLOOD PRESSURE: 118 MMHG | WEIGHT: 169 LBS

## 2021-04-22 DIAGNOSIS — F98.8 ATTENTION DEFICIT DISORDER (ADD) WITHOUT HYPERACTIVITY: Primary | ICD-10-CM

## 2021-04-22 PROCEDURE — 99213 OFFICE O/P EST LOW 20 MIN: CPT | Performed by: FAMILY MEDICINE

## 2021-04-22 RX ORDER — DEXTROAMPHETAMINE SACCHARATE, AMPHETAMINE ASPARTATE MONOHYDRATE, DEXTROAMPHETAMINE SULFATE AND AMPHETAMINE SULFATE 7.5; 7.5; 7.5; 7.5 MG/1; MG/1; MG/1; MG/1
30 CAPSULE, EXTENDED RELEASE ORAL DAILY
Qty: 30 CAPSULE | Refills: 0 | Status: SHIPPED | OUTPATIENT
Start: 2021-04-22 | End: 2021-07-06 | Stop reason: SDUPTHER

## 2021-04-22 NOTE — PROGRESS NOTES
Evaristo Sofia (:  1990) is a 26 y.o. male,Established patient, here for evaluation of the following chief complaint(s):  ADHD      ASSESSMENT/PLAN:  1. Attention deficit disorder (ADD) without hyperactivity  -     amphetamine-dextroamphetamine (ADDERALL XR) 30 MG extended release capsule; Take 1 capsule by mouth daily for 30 days. , Disp-30 capsule, R-0Normal  improved but not well controlled   Discussed titrating up or adding IR later in the day. Will try the former 1st    No follow-ups on file. SUBJECTIVE/OBJECTIVE:  HPI   Pt is a of 26 y.o. male comes in today with   Chief Complaint   Patient presents with    ADHD     adderall xr daily. Will wear off by 2 or 3 if he takes at 6 am.    Vitals:    21 0909   BP: 118/76   Site: Left Upper Arm   Position: Sitting   Cuff Size: Large Adult   Pulse: 76   SpO2: 97%   Weight: 169 lb (76.7 kg)   Height: 5' 8\" (1.727 m)        Review of Systems    Physical Exam            An electronic signature was used to authenticate this note.     --Jovana Reagan MD

## 2021-04-26 ENCOUNTER — OFFICE VISIT (OUTPATIENT)
Dept: ENDOCRINOLOGY | Age: 31
End: 2021-04-26
Payer: COMMERCIAL

## 2021-04-26 VITALS
OXYGEN SATURATION: 98 % | WEIGHT: 168.4 LBS | BODY MASS INDEX: 24.94 KG/M2 | HEART RATE: 90 BPM | HEIGHT: 69 IN | DIASTOLIC BLOOD PRESSURE: 88 MMHG | SYSTOLIC BLOOD PRESSURE: 128 MMHG

## 2021-04-26 DIAGNOSIS — D35.2 PITUITARY MICROADENOMA (HCC): ICD-10-CM

## 2021-04-26 DIAGNOSIS — R79.89 ELEVATED PROLACTIN LEVEL: Primary | ICD-10-CM

## 2021-04-26 PROCEDURE — 99214 OFFICE O/P EST MOD 30 MIN: CPT | Performed by: INTERNAL MEDICINE

## 2021-04-26 RX ORDER — CABERGOLINE 0.5 MG/1
0.25 TABLET ORAL
Qty: 8 TABLET | Refills: 5 | Status: SHIPPED | OUTPATIENT
Start: 2021-04-26 | End: 2022-02-17 | Stop reason: SDUPTHER

## 2021-04-26 NOTE — PROGRESS NOTES
Patient ID:   Chayo Perry is a 26 y.o. male      Chief Complaint:   Chayo Perry is seen for an evaluation of elevated prolactin levels. Subjective:   He was Prolactin 52.9 in 2012, highest 102 in 2014 and then 3.9 in Sep 2014. MRI in  showed 6mm pituitary adenoma which resolved in  (only stalk deviation seen). W up was negative for Gh, cortisol and thyroid hormone in . Prolactin 64.8, , Free T slightly low at 36.8, TSH normal in Sep 2017   MRI brain in Oct 2017 showed right sided pituitary esion of 7x5mm hypoenhancing, deviating the infundubulun to left. Optic chiasm normal.     Mammogram and US breast showed gynecomastia in Oct 2017. Slightly more on left than right. MRI Oct 2018: 3mm pituitary tumor     MRI Oct 2019: stable inferior pituitary lesion     Prolactin 49 (in house) , 27.4 ( [facility]) Dec 2017   2018 2.5   2018 Prolactin 1.2    Last seen in 2019    Prolactin 24.4 Oct 2020 (off cabergoline)     Interval history of mother in law  from liver cancer. Cabergoline 0.25mg twice weekly (Wednesday and )   Bromocriptine caused lethargy. Then switched to cabergoline and stopped in . Testoterone levels normalized on treatment. No Nipple discharge and breast tenderness. Libido good, performance good, have morning erections   Energy levels good, warmer usually as compared to his wife   Regular day to day stress   Have a 3year old daughter and twin girls      He is on Adderal now.        No family history of hyperprolactinemia or any endocrine tumors     The following portions of the patient's history were reviewed and updated as appropriate:       Family History   Problem Relation Age of Onset    No Known Problems Mother     Heart Disease Father     Mult Sclerosis Father     Diabetes Maternal Grandmother     Cancer Paternal Grandmother         leukemia    No Known Problems Paternal Grandfather     No Known Problems Sister     No Known Problems Brother          Social History     Socioeconomic History    Marital status:      Spouse name: Not on file    Number of children: Not on file    Years of education: Not on file    Highest education level: Not on file   Occupational History    Occupation: [employer]:. Social Needs    Financial resource strain: Not on file    Food insecurity     Worry: Not on file     Inability: Not on file    Transportation needs     Medical: Not on file     Non-medical: Not on file   Tobacco Use    Smoking status: Never Smoker    Smokeless tobacco: Never Used   Substance and Sexual Activity    Alcohol use: Yes     Drinks per session: 1 or 2     Comment: 1 drink per day     Drug use: No    Sexual activity: Yes     Partners: Female     Comment: - 1 child   Lifestyle    Physical activity     Days per week: Not on file     Minutes per session: Not on file    Stress: Not on file   Relationships    Social connections     Talks on phone: Not on file     Gets together: Not on file     Attends Holiness service: Not on file     Active member of club or organization: Not on file     Attends meetings of clubs or organizations: Not on file     Relationship status: Not on file    Intimate partner violence     Fear of current or ex partner: Not on file     Emotionally abused: Not on file     Physically abused: Not on file     Forced sexual activity: Not on file   Other Topics Concern    Not on file   Social History Narrative    Not on file         Past Medical History:   Diagnosis Date    Athlete's foot     Brain tumor ([facility] [address] 04/2012    Per pt' was pituitary that has resolved.  Eye problem     Right:eye growth:under care of eye specialist:asymptomatic. No vision deficits.     Increased prolactin level     Pituitary microadenoma ([facility] [address] 12/1/2017         Past Surgical History:   Procedure Laterality Date    WISDOM TOOTH EXTRACTION         No Known Allergies      Current Outpatient Medications:     cabergoline (DOSTINEX) 0.5 MG tablet, Take 0.5 tablets by mouth Twice a Week, Disp: 8 tablet, Rfl: 5    amphetamine-dextroamphetamine (ADDERALL XR) 30 MG extended release capsule, Take 1 capsule by mouth daily for 30 days. , Disp: 30 capsule, Rfl: 0    amphetamine-dextroamphetamine (ADDERALL XR) 20 MG extended release capsule, Take 1 capsule by mouth every morning for 30 days. , Disp: 30 capsule, Rfl: 0    Review of Systems:  Constitutional: Negative for fever, chills, and unexpected weight change. HENT: Negative for congestion, ear pain, rhinorrhea,  sore throat and trouble swallowing. Eyes: Negative for photophobia, redness, itching. Respiratory: Negative for cough, shortness of breath and sputum. Cardiovascular: Negative for chest pain, palpitations and leg swelling. Gastrointestinal: Negative for nausea, vomiting, abdominal pain, diarrhea, constipation. Endocrine: Negative for cold intolerance, heat intolerance, polydipsia, polyphagia and polyuria. Genitourinary: Negative for dysuria, urgency, frequency, hematuria and flank pain. Musculoskeletal: Negative for myalgias, back pain, arthralgias and neck pain. Skin/Nail: Negative for rash, itching. Normal nails. Neurological: Negative for seizures, weakness, light-headedness, numbness and headaches. Hematological/ Lymph nodes: Negative for adenopathy. Does not bruise/bleed easily. Psychiatric/Behavioral: Negative for suicidal ideas, depression, anxiety, sleep disturbance and decreased concentration. Objective:   Physical Exam:  /88 (Site: Left Upper Arm, Position: Sitting, Cuff Size: Medium Adult)   Pulse 90   Ht 5' 9\" (1.753 m)   Wt 168 lb 6.4 oz (76.4 kg)   SpO2 98%   BMI 24.87 kg/m²   Constitutional: Patient is oriented to person, place, and time. Patient appears well-developed and well-nourished. Neurological: Patient is alert and oriented to person, place, and time.     Psychiatric: Patient has a normal mood and affect.  Patient behavior is normal.     Lab Review:    Orders Only on 04/19/2021   Component Date Value Ref Range Status    Prolactin 04/19/2021 1.1  ng/mL Final    Sodium 04/19/2021 139  136 - 145 mmol/L Final    Potassium 04/19/2021 4.2  3.5 - 5.1 mmol/L Final    Chloride 04/19/2021 102  99 - 110 mmol/L Final    CO2 04/19/2021 28  21 - 32 mmol/L Final    Anion Gap 04/19/2021 9  3 - 16 Final    Glucose 04/19/2021 92  70 - 99 mg/dL Final    BUN 04/19/2021 10  7 - 20 mg/dL Final    CREATININE 04/19/2021 0.9  0.9 - 1.3 mg/dL Final    GFR Non- 04/19/2021 >60  >60 Final    GFR  04/19/2021 >60  >60 Final    Calcium 04/19/2021 9.2  8.3 - 10.6 mg/dL Final    Total Protein 04/19/2021 6.8  6.4 - 8.2 g/dL Final    Albumin 04/19/2021 4.6  3.4 - 5.0 g/dL Final    Albumin/Globulin Ratio 04/19/2021 2.1  1.1 - 2.2 Final    Total Bilirubin 04/19/2021 1.1* 0.0 - 1.0 mg/dL Final    Alkaline Phosphatase 04/19/2021 73  40 - 129 U/L Final    ALT 04/19/2021 20  10 - 40 U/L Final    AST 04/19/2021 18  15 - 37 U/L Final    Globulin 04/19/2021 2.2  g/dL Final   Office Visit on 12/04/2020   Component Date Value Ref Range Status    SARS-CoV-2, [place] 12/04/2020 NOT DETECTED  NOT DETECTED Final   Orders Only on 10/13/2020   Component Date Value Ref Range Status    TSH 10/13/2020 0.52  0.27 - 4.20 uIU/mL Final    Prolactin 10/13/2020 24.4  ng/mL Final    Sodium 10/13/2020 141  136 - 145 mmol/L Final    Potassium 10/13/2020 4.3  3.5 - 5.1 mmol/L Final    Chloride 10/13/2020 103  99 - 110 mmol/L Final    CO2 10/13/2020 27  21 - 32 mmol/L Final    Anion Gap 10/13/2020 11  3 - 16 Final    Glucose 10/13/2020 86  70 - 99 mg/dL Final    BUN 10/13/2020 11  7 - 20 mg/dL Final    CREATININE 10/13/2020 0.8* 0.9 - 1.3 mg/dL Final    GFR Non- 10/13/2020 >60  >60 Final    GFR  10/13/2020 >60  >60 Final    Calcium 10/13/2020 9.7  8.3 - 10.6 mg/dL Final    Total Protein 10/13/2020 7.0  6.4 - 8.2 g/dL Final    Albumin 10/13/2020 4.8  3.4 - 5.0 g/dL Final    Albumin/Globulin Ratio 10/13/2020 2.2  1.1 - 2.2 Final    Total Bilirubin 10/13/2020 0.9  0.0 - 1.0 mg/dL Final    Alkaline Phosphatase 10/13/2020 72  40 - 129 U/L Final    ALT 10/13/2020 21  10 - 40 U/L Final    AST 10/13/2020 15  15 - 37 U/L Final    Globulin 10/13/2020 2.2  g/dL Final   Orders Only on 08/18/2020   Component Date Value Ref Range Status    TSH Reflex FT4 08/18/2020 0.44  0.27 - 4.20 uIU/mL Final    WBC 08/18/2020 7.5  4.0 - 11.0 K/uL Final    RBC 08/18/2020 5.29  4. 20 - 5.90 M/uL Final    Hemoglobin 08/18/2020 16.3  13.5 - 17.5 g/dL Final    Hematocrit 08/18/2020 48.3  40.5 - 52.5 % Final    MCV 08/18/2020 91.3  80.0 - 100.0 fL Final    MCH 08/18/2020 30.8  26.0 - 34.0 pg Final    MCHC 08/18/2020 33.7  31.0 - 36.0 g/dL Final    RDW 08/18/2020 12.9  12.4 - 15.4 % Final    Platelets 23/41/0423 295  135 - 450 K/uL Final    MPV 08/18/2020 8.5  5.0 - 10.5 fL Final    Neutrophils % 08/18/2020 57.7  % Final    Lymphocytes % 08/18/2020 31.3  % Final    Monocytes % 08/18/2020 6.9  % Final    Eosinophils % 08/18/2020 3.1  % Final    Basophils % 08/18/2020 1.0  % Final    Neutrophils Absolute 08/18/2020 4.3  1.7 - 7.7 K/uL Final    Lymphocytes Absolute 08/18/2020 2.3  1.0 - 5.1 K/uL Final    Monocytes Absolute 08/18/2020 0.5  0.0 - 1.3 K/uL Final    Eosinophils Absolute 08/18/2020 0.2  0.0 - 0.6 K/uL Final    Basophils Absolute 08/18/2020 0.1  0.0 - 0.2 K/uL Final    Cholesterol, Total 08/18/2020 160  0 - 199 mg/dL Final    Triglycerides 08/18/2020 99  0 - 150 mg/dL Final    HDL 08/18/2020 52  40 - 60 mg/dL Final    LDL Calculated 08/18/2020 88  <100 mg/dL Final    VLDL Cholesterol Calculated 08/18/2020 20  Not Established mg/dL Final   Office Visit on 08/11/2020   Component Date Value Ref Range Status    SARS-CoV-2 08/11/2020 Not Detected  Not Detected Final    Source 08/11/2020 NP swab   Final           Assessment and Plan     Americo Hyman was seen today for follow-up. Diagnoses and all orders for this visit:    Elevated prolactin level  -     Comprehensive Metabolic Panel; Future  -     Prolactin; Future  -     MRI PITUITARY W WO CONTRAST; Future  -     cabergoline (DOSTINEX) 0.5 MG tablet; Take 0.5 tablets by mouth Twice a Week    Pituitary microadenoma ([address]  -     Comprehensive Metabolic Panel; Future  -     Prolactin; Future  -     MRI PITUITARY W WO CONTRAST; Future  -     cabergoline (DOSTINEX) 0.5 MG tablet; Take 0.5 tablets by mouth Twice a Week        1: Hyperprolactinemia   Microprolactinoma     Prolactin 24 Oct 2020 (he was out of cabergoline )    Prolactin 1.1 - April 2021     C/w cabergoline 0.25mg twice a week. No murmur today     If prolactin is suppressed in 3 months, will change cabergoline to once a week     2: Pituitary microadenoma   MRI in Oct 2018: questionable 3mm hypo enhancement within post-inferior imaging only on sagittal imaging, not on coronal imaging. MRI Oct 2019: Stable hypo enhancement in inf sella     Next MRI in summer 2021. Plan was to repeat this year but he is out of meds.      Prolactin, CMP in 8 months   MRI before next visit or next year with jassi Lees MD

## 2021-07-06 DIAGNOSIS — D35.2 PITUITARY MICROADENOMA (HCC): ICD-10-CM

## 2021-07-06 DIAGNOSIS — R79.89 ELEVATED PROLACTIN LEVEL: ICD-10-CM

## 2021-07-06 DIAGNOSIS — F98.8 ATTENTION DEFICIT DISORDER (ADD) WITHOUT HYPERACTIVITY: ICD-10-CM

## 2021-07-06 RX ORDER — DEXTROAMPHETAMINE SACCHARATE, AMPHETAMINE ASPARTATE MONOHYDRATE, DEXTROAMPHETAMINE SULFATE AND AMPHETAMINE SULFATE 7.5; 7.5; 7.5; 7.5 MG/1; MG/1; MG/1; MG/1
30 CAPSULE, EXTENDED RELEASE ORAL DAILY
Qty: 30 CAPSULE | Refills: 0 | Status: SHIPPED | OUTPATIENT
Start: 2021-07-06 | End: 2021-09-26 | Stop reason: SDUPTHER

## 2021-07-06 RX ORDER — CABERGOLINE 0.5 MG/1
0.25 TABLET ORAL
Qty: 8 TABLET | Refills: 5 | OUTPATIENT
Start: 2021-07-08 | End: 2022-07-08

## 2021-07-06 NOTE — TELEPHONE ENCOUNTER
Medication:   Requested Prescriptions     Pending Prescriptions Disp Refills    amphetamine-dextroamphetamine (ADDERALL XR) 30 MG extended release capsule 30 capsule 0     Sig: Take 1 capsule by mouth daily for 30 days. Last Filled:  4/22/21     Patient Phone Number: [phone] (home)     Last appt: 4/22/2021   Next appt: Visit date not found    Last OARRS: No flowsheet data found.

## 2021-10-01 ENCOUNTER — OFFICE VISIT (OUTPATIENT)
Dept: FAMILY MEDICINE CLINIC | Age: 31
End: 2021-10-01
Payer: COMMERCIAL

## 2021-10-01 VITALS
BODY MASS INDEX: 24.37 KG/M2 | DIASTOLIC BLOOD PRESSURE: 86 MMHG | SYSTOLIC BLOOD PRESSURE: 118 MMHG | TEMPERATURE: 97.7 F | OXYGEN SATURATION: 98 % | WEIGHT: 165 LBS | RESPIRATION RATE: 16 BRPM | HEART RATE: 90 BPM

## 2021-10-01 DIAGNOSIS — H11.009 PTERYGIUM, UNSPECIFIED LATERALITY: Primary | ICD-10-CM

## 2021-10-01 DIAGNOSIS — F98.8 ATTENTION DEFICIT DISORDER (ADD) WITHOUT HYPERACTIVITY: ICD-10-CM

## 2021-10-01 DIAGNOSIS — Z23 NEED FOR INFLUENZA VACCINATION: ICD-10-CM

## 2021-10-01 PROCEDURE — 90674 CCIIV4 VAC NO PRSV 0.5 ML IM: CPT | Performed by: FAMILY MEDICINE

## 2021-10-01 PROCEDURE — 1036F TOBACCO NON-USER: CPT | Performed by: FAMILY MEDICINE

## 2021-10-01 PROCEDURE — 90471 IMMUNIZATION ADMIN: CPT | Performed by: FAMILY MEDICINE

## 2021-10-01 PROCEDURE — G8427 DOCREV CUR MEDS BY ELIG CLIN: HCPCS | Performed by: FAMILY MEDICINE

## 2021-10-01 PROCEDURE — G8420 CALC BMI NORM PARAMETERS: HCPCS | Performed by: FAMILY MEDICINE

## 2021-10-01 PROCEDURE — 99213 OFFICE O/P EST LOW 20 MIN: CPT | Performed by: FAMILY MEDICINE

## 2021-10-01 PROCEDURE — G8482 FLU IMMUNIZE ORDER/ADMIN: HCPCS | Performed by: FAMILY MEDICINE

## 2021-10-01 SDOH — ECONOMIC STABILITY: FOOD INSECURITY: WITHIN THE PAST 12 MONTHS, YOU WORRIED THAT YOUR FOOD WOULD RUN OUT BEFORE YOU GOT MONEY TO BUY MORE.: NEVER TRUE

## 2021-10-01 SDOH — ECONOMIC STABILITY: FOOD INSECURITY: WITHIN THE PAST 12 MONTHS, THE FOOD YOU BOUGHT JUST DIDN'T LAST AND YOU DIDN'T HAVE MONEY TO GET MORE.: NEVER TRUE

## 2021-10-01 SDOH — ECONOMIC STABILITY: TRANSPORTATION INSECURITY
IN THE PAST 12 MONTHS, HAS THE LACK OF TRANSPORTATION KEPT YOU FROM MEDICAL APPOINTMENTS OR FROM GETTING MEDICATIONS?: NO

## 2021-10-01 SDOH — ECONOMIC STABILITY: TRANSPORTATION INSECURITY
IN THE PAST 12 MONTHS, HAS LACK OF TRANSPORTATION KEPT YOU FROM MEETINGS, WORK, OR FROM GETTING THINGS NEEDED FOR DAILY LIVING?: NO

## 2021-10-01 ASSESSMENT — SOCIAL DETERMINANTS OF HEALTH (SDOH): HOW HARD IS IT FOR YOU TO PAY FOR THE VERY BASICS LIKE FOOD, HOUSING, MEDICAL CARE, AND HEATING?: NOT HARD AT ALL

## 2021-10-01 NOTE — PROGRESS NOTES
[facility] (:  1990) is a 26 y.o. male,Established patient, here for evaluation of the following chief complaint(s):  Medication Check and Eye Problem (bilateral eye growth in white part of eye. vision ok)         ASSESSMENT/PLAN:  [facility] was seen today for medication check and eye problem. Diagnoses and all orders for this visit:    Pterygium, unspecified laterality  Reviewed differential diagnosis  Will follow up with eye doc  Attention deficit disorder (ADD) without hyperactivity  The current medical regimen is effective;  continue present plan and medications. Need for influenza vaccination  -     INFLUENZA, MDCK QUADV, 2 YRS AND OLDER, IM, PF, PREFILL SYR OR SDV, 0.5ML (FLUCELVAX QUADV, PF)         No follow-ups on file. Subjective   SUBJECTIVE/OBJECTIVE:  HPI   Pt is a of 26 y.o. male comes in today with   Chief Complaint   Patient presents with    Medication Check    Eye Problem     bilateral eye growth in white part of eye. vision ok     Growth on cornea. Getting bigger. adderall used prn when symptoms really bad. Dose increase has been effective. Vitals:    10/01/21 1426   BP: 118/86   Pulse: 90   Resp: 16   Temp: 97.7 °F (36.5 °C)   TempSrc: Tympanic   SpO2: 98%   Weight: 165 lb (74.8 kg)        Review of Systems       Objective   Physical Exam         An electronic signature was used to authenticate this note.     --Deidre Zapata MD

## 2021-12-28 ENCOUNTER — HOSPITAL ENCOUNTER (OUTPATIENT)
Dept: MRI IMAGING | Age: 31
Discharge: HOME | End: 2021-12-28
Payer: COMMERCIAL

## 2021-12-28 DIAGNOSIS — R79.89 ELEVATED PROLACTIN LEVEL: ICD-10-CM

## 2021-12-28 DIAGNOSIS — D35.2 PITUITARY MICROADENOMA (HCC): ICD-10-CM

## 2021-12-28 PROCEDURE — A9579 GAD-BASE MR CONTRAST NOS,1ML: HCPCS | Performed by: INTERNAL MEDICINE

## 2021-12-28 PROCEDURE — 6360000004 HC RX CONTRAST MEDICATION: Performed by: INTERNAL MEDICINE

## 2021-12-28 PROCEDURE — 70553 MRI BRAIN STEM W/O & W/DYE: CPT

## 2021-12-28 RX ADMIN — GADOTERIDOL 15 ML: 279.3 INJECTION, SOLUTION INTRAVENOUS at 10:25

## 2022-01-03 ENCOUNTER — TELEPHONE (OUTPATIENT)
Dept: ENDOCRINOLOGY | Age: 32
End: 2022-01-03

## 2022-01-03 NOTE — TELEPHONE ENCOUNTER
----- Message from Claribel Mcallister MD sent at 1/3/2022 10:27 AM EST -----  Please make a follow up   Will discuss on NOV

## 2022-02-06 DIAGNOSIS — F98.8 ATTENTION DEFICIT DISORDER (ADD) WITHOUT HYPERACTIVITY: ICD-10-CM

## 2022-02-07 RX ORDER — DEXTROAMPHETAMINE SACCHARATE, AMPHETAMINE ASPARTATE MONOHYDRATE, DEXTROAMPHETAMINE SULFATE AND AMPHETAMINE SULFATE 7.5; 7.5; 7.5; 7.5 MG/1; MG/1; MG/1; MG/1
30 CAPSULE, EXTENDED RELEASE ORAL DAILY
Qty: 30 CAPSULE | Refills: 0 | Status: SHIPPED | OUTPATIENT
Start: 2022-02-07 | End: 2022-03-09

## 2022-02-07 NOTE — TELEPHONE ENCOUNTER
Medication:   Requested Prescriptions     Pending Prescriptions Disp Refills    amphetamine-dextroamphetamine (ADDERALL XR) 30 MG extended release capsule 30 capsule 0     Sig: Take 1 capsule by mouth daily for 30 days. Last Filled:    09/27/2021  Patient Phone Number: [phone] (home)     Last appt: 10/1/2021   Next appt: Visit date not found    Last OARRS: No flowsheet data found.

## 2022-02-17 ENCOUNTER — OFFICE VISIT (OUTPATIENT)
Dept: ENDOCRINOLOGY | Age: 32
End: 2022-02-17
Payer: COMMERCIAL

## 2022-02-17 VITALS
BODY MASS INDEX: 24.37 KG/M2 | HEART RATE: 98 BPM | OXYGEN SATURATION: 100 % | DIASTOLIC BLOOD PRESSURE: 92 MMHG | HEIGHT: 69 IN | SYSTOLIC BLOOD PRESSURE: 145 MMHG

## 2022-02-17 DIAGNOSIS — D35.2 PITUITARY MICROADENOMA (HCC): ICD-10-CM

## 2022-02-17 DIAGNOSIS — R79.89 ELEVATED PROLACTIN LEVEL: Primary | ICD-10-CM

## 2022-02-17 DIAGNOSIS — R79.89 ELEVATED PROLACTIN LEVEL: ICD-10-CM

## 2022-02-17 LAB
A/G RATIO: 1.8 (ref 1.1–2.2)
ALBUMIN SERPL-MCNC: 4.6 G/DL (ref 3.4–5)
ALP BLD-CCNC: 75 U/L (ref 40–129)
ALT SERPL-CCNC: 39 U/L (ref 10–40)
ANION GAP SERPL CALCULATED.3IONS-SCNC: 13 MMOL/L (ref 3–16)
AST SERPL-CCNC: 25 U/L (ref 15–37)
BILIRUB SERPL-MCNC: 0.9 MG/DL (ref 0–1)
BUN BLDV-MCNC: 12 MG/DL (ref 7–20)
CALCIUM SERPL-MCNC: 9.6 MG/DL (ref 8.3–10.6)
CHLORIDE BLD-SCNC: 102 MMOL/L (ref 99–110)
CO2: 25 MMOL/L (ref 21–32)
CREAT SERPL-MCNC: 0.9 MG/DL (ref 0.9–1.3)
GFR AFRICAN AMERICAN: >60
GFR NON-AFRICAN AMERICAN: >60
GLUCOSE BLD-MCNC: 97 MG/DL (ref 70–99)
POTASSIUM SERPL-SCNC: 4.2 MMOL/L (ref 3.5–5.1)
PROLACTIN: 0.6 NG/ML
SODIUM BLD-SCNC: 140 MMOL/L (ref 136–145)
TOTAL PROTEIN: 7.1 G/DL (ref 6.4–8.2)

## 2022-02-17 PROCEDURE — G8482 FLU IMMUNIZE ORDER/ADMIN: HCPCS | Performed by: INTERNAL MEDICINE

## 2022-02-17 PROCEDURE — 1036F TOBACCO NON-USER: CPT | Performed by: INTERNAL MEDICINE

## 2022-02-17 PROCEDURE — 99214 OFFICE O/P EST MOD 30 MIN: CPT | Performed by: INTERNAL MEDICINE

## 2022-02-17 PROCEDURE — G8420 CALC BMI NORM PARAMETERS: HCPCS | Performed by: INTERNAL MEDICINE

## 2022-02-17 PROCEDURE — G8427 DOCREV CUR MEDS BY ELIG CLIN: HCPCS | Performed by: INTERNAL MEDICINE

## 2022-02-17 RX ORDER — CABERGOLINE 0.5 MG/1
0.25 TABLET ORAL
Qty: 8 TABLET | Refills: 5 | Status: SHIPPED | OUTPATIENT
Start: 2022-02-17 | End: 2023-02-17

## 2022-02-17 NOTE — PROGRESS NOTES
Patient ID:   Debi Quintana is a 33 y.o. male      Chief Complaint:   Debi Quintana is seen for an evaluation of elevated prolactin levels. Subjective:   He was Prolactin 52.9 in April 2012, highest 102 in June 2014 and then 3.9 in Sep 2014. MRI in 2012 showed 6mm pituitary adenoma which resolved in 2013 (only stalk deviation seen). W up was negative for Gh, cortisol and thyroid hormone in 2012. Prolactin 64.8, , Free T slightly low at 36.8, TSH normal in Sep 2017   MRI brain in Oct 2017 showed right sided pituitary esion of 7x5mm hypoenhancing, deviating the infundubulun to left. Optic chiasm normal.     Mammogram and US breast showed gynecomastia in Oct 2017. Slightly more on left than right. MRI Oct 2018: 3mm pituitary tumor     MRI Oct 2019: stable inferior pituitary lesion     Prolactin 49 (in house) , 27.4 ( [facility]) Dec 2017   March 2018 2.5   July 2018 Prolactin 1.2    Last seen in July 2019    Prolactin 24.4 Oct 2020 (off cabergoline)   Bromocriptine caused lethargy. Then switched to cabergoline and stopped in 2015. Testoterone levels normalized on treatment. Interval history    Cabergoline 0.25mg twice weekly (Thursday and Sunday)     No nipple discharge and breast tenderness. Libido good, performance good, have morning erections   Energy levels are good  No heat or cold intolerance   Denies palpitations, dizziness   Regular day to day stress   Have a 10year old daughter and twin girls    Has vasectomy     He is on Adderal now.        No family history of hyperprolactinemia or any endocrine tumors     The following portions of the patient's history were reviewed and updated as appropriate:       Family History   Problem Relation Age of Onset    No Known Problems Mother     Heart Disease Father     Mult Sclerosis Father     Diabetes Maternal Grandmother     Cancer Paternal Grandmother         leukemia    No Known Problems Paternal Grandfather     No Known Problems Sister     No Known Problems Brother          Social History     Socioeconomic History    Marital status:      Spouse name: Not on file    Number of children: Not on file    Years of education: Not on file    Highest education level: Not on file   Occupational History    Occupation: [employer]:. Tobacco Use    Smoking status: Never Smoker    Smokeless tobacco: Never Used   Vaping Use    Vaping Use: Never used   Substance and Sexual Activity    Alcohol use: Yes     Comment: 1 drink per day     Drug use: No    Sexual activity: Yes     Partners: Female     Comment: - 1 child   Other Topics Concern    Not on file   Social History Narrative    Not on file     Social Determinants of Health     Financial Resource Strain: Low Risk     Difficulty of Paying Living Expenses: Not hard at all   Food Insecurity: No Food Insecurity    Worried About Running Out of Food in the Last Year: Never true    Dena of Food in the Last Year: Never true   Transportation Needs: No Transportation Needs    Lack of Transportation (Medical): No    Lack of Transportation (Non-Medical):  No   Physical Activity:     Days of Exercise per Week: Not on file    Minutes of Exercise per Session: Not on file   Stress:     Feeling of Stress : Not on file   Social Connections:     Frequency of Communication with Friends and Family: Not on file    Frequency of Social Gatherings with Friends and Family: Not on file    Attends Buddhist Services: Not on file    Active Member of Clubs or Organizations: Not on file    Attends Club or Organization Meetings: Not on file    Marital Status: Not on file   Intimate Partner Violence:     Fear of Current or Ex-Partner: Not on file    Emotionally Abused: Not on file    Physically Abused: Not on file    Sexually Abused: Not on file   Housing Stability:     Unable to Pay for Housing in the Last Year: Not on file    Number of [place] in the Last Year: Not on file  Unstable Housing in the Last Year: Not on file         Past Medical History:   Diagnosis Date    Athlete's foot     Brain tumor ([facility] [address] 04/2012    Per pt' was pituitary that has resolved.  Eye problem     Right:eye growth:under care of eye specialist:asymptomatic. No vision deficits.  Increased prolactin level     [facility] 12/[facility]/2017         Past Surgical History:   Procedure Laterality Date    WISDOM TOOTH EXTRACTION         No Known Allergies      Current Outpatient Medications:     cabergoline (DOSTINEX) 0.5 MG tablet, Take 0.5 tablets by mouth Twice a Week, Disp: 8 tablet, Rfl: 5    amphetamine-dextroamphetamine (ADDERALL XR) 30 MG extended release capsule, Take 1 capsule by mouth daily for 30 days. , Disp: 30 capsule, Rfl: 0    Review of Systems:  Constitutional: Negative for fever, chills, and unexpected weight change. HENT: Negative for congestion, ear pain, rhinorrhea,  sore throat and trouble swallowing. Eyes: Negative for photophobia, redness, itching. Respiratory: Negative for cough, shortness of breath and sputum. Cardiovascular: Negative for chest pain, palpitations and leg swelling. Gastrointestinal: Negative for nausea, vomiting, abdominal pain, diarrhea, constipation. Endocrine: Negative for cold intolerance, heat intolerance, polydipsia, polyphagia and polyuria. Genitourinary: Negative for dysuria, urgency, frequency, hematuria and flank pain. Musculoskeletal: Negative for myalgias, back pain, arthralgias and neck pain. Skin/Nail: Negative for rash, itching. Normal nails. Neurological: Negative for seizures, weakness, light-headedness, numbness and headaches. Hematological/ Lymph nodes: Negative for adenopathy. Does not bruise/bleed easily. Psychiatric/Behavioral: Negative for suicidal ideas, depression, anxiety, sleep disturbance and decreased concentration.         Objective:   Physical Exam:  BP (!) 133/92 (Site: Right Upper Arm, Position: Sitting, Cuff Size: Large Adult)   Pulse 98   Ht 5' 9\" (1.753 m)   SpO2 100%   BMI 24.37 kg/m²   Constitutional: Patient is oriented to person, place, and time. Patient appears well-developed and well-nourished. Cardiac Normal heart sounds  Resp: Normal breathing  Neurological: Patient is alert and oriented to person, place, and time. Slow reflexes  Psychiatric: Patient has a normal mood and affect. Patient behavior is normal.     Lab Review:    Orders Only on 04/19/2021   Component Date Value Ref Range Status    Prolactin 04/19/2021 1.1  ng/mL Final    Sodium 04/19/2021 139  136 - 145 mmol/L Final    Potassium 04/19/2021 4.2  3.5 - 5.1 mmol/L Final    Chloride 04/19/2021 102  99 - 110 mmol/L Final    CO2 04/19/2021 28  21 - 32 mmol/L Final    Anion Gap 04/19/2021 9  3 - 16 Final    Glucose 04/19/2021 92  70 - 99 mg/dL Final    BUN 04/19/2021 10  7 - 20 mg/dL Final    CREATININE 04/19/2021 0.9  0.9 - 1.3 mg/dL Final    GFR Non- 04/19/2021 >60  >60 Final    GFR  04/19/2021 >60  >60 Final    Calcium 04/19/2021 9.2  8.3 - 10.6 mg/dL Final    Total Protein 04/19/2021 6.8  6.4 - 8.2 g/dL Final    Albumin 04/19/2021 4.6  3.4 - 5.0 g/dL Final    Albumin/Globulin Ratio 04/19/2021 2.1  1.1 - 2.2 Final    Total Bilirubin 04/19/2021 1.1* 0.0 - 1.0 mg/dL Final    Alkaline Phosphatase 04/19/2021 73  40 - 129 U/L Final    ALT 04/19/2021 20  10 - 40 U/L Final    AST 04/19/2021 18  15 - 37 U/L Final    Globulin 04/19/2021 2.2  g/dL Final           Assessment and Plan     Marlene Franco was seen today for follow-up. Diagnoses and all orders for this visit:    Elevated prolactin level  -     Comprehensive Metabolic Panel; Standing  -     Prolactin; Standing  -     cabergoline (DOSTINEX) 0.5 MG tablet; Take 0.5 tablets by mouth Twice a Week  -     TSH;  Future  -     T4, Free; Future  -     T3, Free; Future    Pituitary microadenoma (HCC)  -     Comprehensive Metabolic Panel; Standing  -     Prolactin; Standing  -     cabergoline (DOSTINEX) 0.5 MG tablet; Take 0.5 tablets by mouth Twice a Week  -     TSH; Future  -     T4, Free; Future  -     T3, Free; Future        1: Hyperprolactinemia   Microprolactinoma     Prolactin 24 Oct 2020 (he was out of cabergoline )    Prolactin 1.1 - April 2021     C/w cabergoline 0.25mg twice a week. Check levels today   If levels are low like last time, I will change 0.25mg once a week and then check numbers in 3 months     2: Pituitary microadenoma   MRI in Oct 2018: questionable 3mm hypo enhancement within post-inferior imaging only on sagittal imaging, not on coronal imaging. MRI Oct 2019: Stable hypo enhancement in inf sella     MRI Dec 2021: 2 mm hypoenhancing nodule in the inferior pituitary gland may represent a cystic microadenoma or Rathke's cleft cyst. The lesion is unchanged from 2019.      MRI in 18-24 months      Prolactin, CMP today and in 3 months      Follow up in 12 months       Nina Christianson MD signed on 2/17/22 at 8:24 AM

## 2022-09-23 ENCOUNTER — OFFICE VISIT (OUTPATIENT)
Dept: FAMILY MEDICINE CLINIC | Age: 32
End: 2022-09-23
Payer: COMMERCIAL

## 2022-09-23 VITALS
HEART RATE: 74 BPM | TEMPERATURE: 98.2 F | OXYGEN SATURATION: 98 % | WEIGHT: 171 LBS | SYSTOLIC BLOOD PRESSURE: 122 MMHG | RESPIRATION RATE: 16 BRPM | DIASTOLIC BLOOD PRESSURE: 82 MMHG | HEIGHT: 69 IN | BODY MASS INDEX: 25.33 KG/M2

## 2022-09-23 DIAGNOSIS — F98.8 ATTENTION DEFICIT DISORDER (ADD) WITHOUT HYPERACTIVITY: ICD-10-CM

## 2022-09-23 DIAGNOSIS — Z00.00 WELL ADULT EXAM: Primary | ICD-10-CM

## 2022-09-23 DIAGNOSIS — R05.3 CHRONIC COUGH: ICD-10-CM

## 2022-09-23 PROCEDURE — 90471 IMMUNIZATION ADMIN: CPT | Performed by: FAMILY MEDICINE

## 2022-09-23 PROCEDURE — 90674 CCIIV4 VAC NO PRSV 0.5 ML IM: CPT | Performed by: FAMILY MEDICINE

## 2022-09-23 PROCEDURE — 99395 PREV VISIT EST AGE 18-39: CPT | Performed by: FAMILY MEDICINE

## 2022-09-23 RX ORDER — FLUTICASONE PROPIONATE 50 MCG
1 SPRAY, SUSPENSION (ML) NASAL DAILY
Qty: 32 G | Refills: 2 | Status: SHIPPED | OUTPATIENT
Start: 2022-09-23

## 2022-09-23 RX ORDER — DEXTROAMPHETAMINE SACCHARATE, AMPHETAMINE ASPARTATE, DEXTROAMPHETAMINE SULFATE AND AMPHETAMINE SULFATE 5; 5; 5; 5 MG/1; MG/1; MG/1; MG/1
20 TABLET ORAL DAILY
Qty: 15 TABLET | Refills: 0 | Status: SHIPPED | OUTPATIENT
Start: 2022-09-23 | End: 2022-10-24 | Stop reason: SDUPTHER

## 2022-09-23 RX ORDER — AZELASTINE 1 MG/ML
2 SPRAY, METERED NASAL NIGHTLY
Qty: 30 ML | Refills: 2 | Status: SHIPPED | OUTPATIENT
Start: 2022-09-23 | End: 2022-10-17

## 2022-09-23 ASSESSMENT — PATIENT HEALTH QUESTIONNAIRE - PHQ9
2. FEELING DOWN, DEPRESSED OR HOPELESS: 0
6. FEELING BAD ABOUT YOURSELF - OR THAT YOU ARE A FAILURE OR HAVE LET YOURSELF OR YOUR FAMILY DOWN: 0
8. MOVING OR SPEAKING SO SLOWLY THAT OTHER PEOPLE COULD HAVE NOTICED. OR THE OPPOSITE, BEING SO FIGETY OR RESTLESS THAT YOU HAVE BEEN MOVING AROUND A LOT MORE THAN USUAL: 0
SUM OF ALL RESPONSES TO PHQ9 QUESTIONS 1 & 2: 0
10. IF YOU CHECKED OFF ANY PROBLEMS, HOW DIFFICULT HAVE THESE PROBLEMS MADE IT FOR YOU TO DO YOUR WORK, TAKE CARE OF THINGS AT HOME, OR GET ALONG WITH OTHER PEOPLE: 0
SUM OF ALL RESPONSES TO PHQ QUESTIONS 1-9: 1
7. TROUBLE CONCENTRATING ON THINGS, SUCH AS READING THE NEWSPAPER OR WATCHING TELEVISION: 1
9. THOUGHTS THAT YOU WOULD BE BETTER OFF DEAD, OR OF HURTING YOURSELF: 0
SUM OF ALL RESPONSES TO PHQ QUESTIONS 1-9: 1
5. POOR APPETITE OR OVEREATING: 0
SUM OF ALL RESPONSES TO PHQ QUESTIONS 1-9: 1
4. FEELING TIRED OR HAVING LITTLE ENERGY: 0
1. LITTLE INTEREST OR PLEASURE IN DOING THINGS: 0
SUM OF ALL RESPONSES TO PHQ QUESTIONS 1-9: 1
3. TROUBLE FALLING OR STAYING ASLEEP: 0

## 2022-09-23 NOTE — PROGRESS NOTES
Zahra Villanueva (:  1990) is a 33 y.o. male,Established patient, here for evaluation of the following chief complaint(s): Annual Exam, Cough (Ongoing dry cough, notice sometimes he will gag after. Has been ongoing for about a year), and Other (Also wanting to discuss possibly starting medication for ADHD, stated he was on medication for this in middle school)         ASSESSMENT/PLAN:  Oscar Singh was seen today for annual exam, cough and other. Diagnoses and all orders for this visit:    Well adult exam  -     Lipid Panel; Future  Reviewed diet and exercise  Attention deficit disorder (ADD) without hyperactivity  -     amphetamine-dextroamphetamine (ADDERALL, 20MG,) 20 MG tablet; Take 1 tablet by mouth daily for 15 days. Not well controlled. Will look into strattera. Can try IR adderall for now since xr had some sleep effects and preferred to take daily  Chronic cough  Reviewed differential diagnosis. Sounds like PND  Flonase and astelin. Consider ent referral if persists to distinguish LPR from PND  No risk factors for asthma and no prior dx of this  Other orders  -     fluticasone (FLONASE) 50 MCG/ACT nasal spray; 1 spray by Each Nostril route daily  -     azelastine (ASTELIN) 0.1 % nasal spray; 2 sprays by Nasal route at bedtime Use in each nostril as directed  -     Influenza, FLUCELVAX, (age 10 mo+), IM, Preservative Free, 0.5 mL       No follow-ups on file. Subjective   SUBJECTIVE/OBJECTIVE:  HPI  Pt is a of 33 y.o. male comes in today with   Chief Complaint   Patient presents with    Annual Exam    Cough     Ongoing dry cough, notice sometimes he will gag after. Has been ongoing for about a year    Other     Also wanting to discuss possibly starting medication for ADHD, stated he was on medication for this in middle school     Cough over the past year. Worse in am. Recurs in evening. No relation to weather. Claritin does not help. No congestion but feels like he has drainage.   No gerd. No asthma when younger. No alleviating factors other than temporary with lozenges. Stopped adderall earlier this year. More difficulty staying on point and concentrating on task at hand. Med helped him stay more driven. Usually just uses on days he needs to be more productive. In high school was on stimulants. Vitals:    09/23/22 0930   BP: 122/82   Pulse: 74   Resp: 16   Temp: 98.2 °F (36.8 °C)   SpO2: 98%   Weight: 171 lb (77.6 kg)   Height: 5' 9\" (1.753 m)      Review of Systems   Constitutional: Negative. Cardiovascular: Negative. Psychiatric/Behavioral: Negative. Objective   Physical Exam  Constitutional:       Appearance: He is well-developed. HENT:      Right Ear: Tympanic membrane, ear canal and external ear normal.      Left Ear: Tympanic membrane, ear canal and external ear normal.      Nose: Mucosal edema present. Right Sinus: No maxillary sinus tenderness or frontal sinus tenderness. Left Sinus: No maxillary sinus tenderness or frontal sinus tenderness. Mouth/Throat:      Pharynx: No oropharyngeal exudate. Eyes:      General: No scleral icterus. Conjunctiva/sclera: Conjunctivae normal.   Neck:      Thyroid: No thyromegaly. Cardiovascular:      Rate and Rhythm: Normal rate and regular rhythm. Heart sounds: Normal heart sounds. No murmur heard. Pulmonary:      Effort: Pulmonary effort is normal.      Breath sounds: Normal breath sounds. No wheezing or rales. Musculoskeletal:      Cervical back: Neck supple. Lymphadenopathy:      Head:      Right side of head: No submandibular or preauricular adenopathy. Left side of head: No submandibular or preauricular adenopathy. Cervical: No cervical adenopathy. Skin:     General: Skin is warm and dry. Nails: There is no clubbing. Neurological:      Mental Status: He is alert and oriented to person, place, and time. Cranial Nerves: No cranial nerve deficit.    Psychiatric: Behavior: Behavior normal.            An electronic signature was used to authenticate this note.     --Silvina Davis MD

## 2022-09-23 NOTE — PATIENT INSTRUCTIONS
What Is STRATTERA? STRATTERA is a selective norepinephrine reuptake inhibitor medicine. It is used for the treatment  of attention deficit and hyperactivity disorder (ADHD). STRATTERA may help increase attention and  decrease impulsiveness and hyperactivity in patients with ADHD. STRATTERA should be used as a part of a total treatment program for ADHD that may includecounseling or other therapies. STRATTERA has not been studied in children less than 10years old. Who should not take STRATTERA? STRATTERA should not be taken if you or your child:   are taking or have taken within the past 14 days an anti-depression medicine called a monoamine oxidase inhibitor or MAOI. Some names of MAOI medicines are Nardil® (phenelzine sulfate), Parnate® (tranylcypromine sulfate) and Emsam® (selegiline transdermal system). Reference ID: [id number]     have an eye problem called narrow angle glaucoma   are allergic to anything in STRATTERA. See the end of this Medication Guide for a complete list of ingredients.   have or have had a rare tumor called pheochromocytoma. STRATTERA may not be right for you or your child. Before starting STRATTERA tell yourdoctor or your child's doctor about all health conditions (or a family history of) including:   have or had suicide thoughts or actions   heart problems, heart defects, irregular heart beat, high blood pressure, or low blood pressure   mental problems, psychosis, sulema, bipolar illness, or depression   liver problems Tell your doctor if you or your child is pregnant, planning to become pregnant, or breastfeeding. Can STRATTERA be taken with other medicines? Tell your doctor about all the medicines that you or your child takes including prescription   and nonprescription medicines, vitamins, and herbal supplements. STRATTERA and some   medicines may interact with each other and cause serious side effects.  Your doctor will decide   whether STRATTERA can be taken with other medicines. Especially tell your doctor if you or your child takes:   asthma medicines   anti-depression medicines including MAOIs   blood pressure medicines   cold or allergy medicines that contain decongestants     Know the medicines that you or your child takes. Keep a list of your medicines with you to [place] doctor and pharmacist.   Jazzy Burton not start any new medicine while taking STRATTERA without talking to your doctorfirst.   How should STRATTERA be taken? Take STRATTERA exactly as prescribed. STRATTERA comes in different dose strength capsules. Your doctor may adjust the dose until it is right for you or your child. Do not chew, crush, or open the capsules. Swallow STRATTERA capsules whole with water or other liquids. Tell your doctor if you or your child cannot swallow STRATTERA whole. A different medicine may need to be prescribed. Avoid touching a broken STRATTERA capsule. Wash hands and surfaces that touched anopen STRATTERA capsule. If any of the powder gets in your eyes or your child's eyes, rinse them with water right away and call your doctor. STRATTERA can be taken with or without food. STRATTERA is usually taken once or twice a day. Take STRATTERA at the same time each day to help you remember. If you miss a dose of STRATTERA, take it as soon as you remember that day. If you miss a day of STRATTERA, do not double your dose the next day. Just skip the day you missed. From time to time, your doctor may stop STRATTERA treatment for a while to check ADHD symptoms. Your doctor may do regular checks of the blood, heart, and blood pressure while takingSTRATTERA. Children should have their height and weight checked often while taking     Reference ID: [id number]   STRATTERA. STRATTERA treatment may be stopped if a problem is found during these check-ups.     If you or your child takes too much STRATTERA or overdoses, call your doctor or[facility] control center right away, or get emergency treatment. What are possible side effects of STRATTERA? See Cathleen Beant is the most important information I should know about STRATTERA? \" for information on reported suicidal thoughts and actions, other mental problems, severe liver damage,and heart problems. Other serious side effects include:   serious allergic reactions (call your doctor if you have trouble breathing, see swelling or hives,or experience other allergic reactions)   slowing of growth (height and weight) in children    problems passing urine including   trouble starting or keeping a urine stream   cannot fully empty the bladder     Common side effects in children and teenagers include:   upset stomach   decreased appetite   nausea or vomiting   dizziness   tiredness   mood swings     Common side effects in adults include:   constipation   dry mouth   nausea   decreased appetite   dizziness   sexual side effects   problems passing urine     Other information for children, teenagers, and adults:   Erections that won't go away (priapism) have occurred rarely during treatment with STRATTERA. If you have an erection that lasts more than 4 hours, seek medical help rightaway. Because of the potential for lasting damage, including the potential inability to have erections, priapism should be evaluated by a doctor immediately. STRATTERA may affect your ability or your child's ability to drive or operate heavy machinery. Be careful until you know how STRATTERA affects you or your child. Talk to your doctor if you or your child has side effects that are bothersome or do not go away. This is not a complete list of possible side effects. Call your doctor for medical advice about side effects. You may report side effects to FDA at 5-366-FDA-2540. Reference ID: [id number]   How should I store [address] [place] [address]? Store STRATTERA in a safe place at room temperature, 59 to 86°F (15 to 30°C). Keep STRATTERA and all medicines out of the reach of children.  General information about STRATTERA     Medicines are sometimes prescribed for purposes other than those listed in a Medication Guide. Do not use STRATTERA for a condition for which it was not prescribed. Do not give STRATTERA toother people, even if they have the same condition. It may harm them. This Medication Guide summarizes the most important information about STRATTERA. If you would like more information, talk with your doctor. You can ask your doctor or pharmacist forinformation about STRATTERA that was written for healthcare professionals. For more information about STRATTERA call 1-940-Uofgq-Rx (8-314.449.9557) or visit www.strattera.com. What are the ingredients in STRATTERA? Active ingredient: atomoxetine hydrochloride. Inactive ingredients: pregelatinized starch, dimethicone, gelatin, sodium lauryl sulfate, FD&CBlue No. 2, synthetic yellow iron oxide, titanium dioxide, red iron oxide, and edible black ink. Geeendmarcelo Osgood is a registered trademark of [employer]. Chao Riedel is a registered trademark of [address] [place] [address]. Emsam® is a registered trademark of [employer].   This Medication Guide has been approved by the [employer] and [facility]. Patient Information revised February 20, 2014   Marketed by: [facility], [address], [address] [facility]. [facility]   Copyright © 2003, [employer], [place]

## 2022-10-17 RX ORDER — AZELASTINE HYDROCHLORIDE 137 UG/1
SPRAY, METERED NASAL
Qty: 30 ML | Refills: 2 | Status: SHIPPED | OUTPATIENT
Start: 2022-10-17

## 2022-10-17 NOTE — TELEPHONE ENCOUNTER
Medication:   Requested Prescriptions     Pending Prescriptions Disp Refills    Azelastine HCl 137 MCG/SPRAY SOLN [Pharmacy Med Name: AZELASTINE 0.1% (137 MCG) SPRY]  2     Si SPRAYS BY NASAL ROUTE AT BEDTIME USE IN EACH NOSTRIL AS DIRECTED        Last Filled:  22    Patient Phone Number: [phone] (home)     Last appt: 2022   Next appt: Visit date not found    Last OARRS: No flowsheet data found.

## 2022-10-24 DIAGNOSIS — F98.8 ATTENTION DEFICIT DISORDER (ADD) WITHOUT HYPERACTIVITY: ICD-10-CM

## 2022-10-24 RX ORDER — DEXTROAMPHETAMINE SACCHARATE, AMPHETAMINE ASPARTATE, DEXTROAMPHETAMINE SULFATE AND AMPHETAMINE SULFATE 5; 5; 5; 5 MG/1; MG/1; MG/1; MG/1
20 TABLET ORAL DAILY
Qty: 15 TABLET | Refills: 0 | Status: SHIPPED | OUTPATIENT
Start: 2022-10-24 | End: 2022-11-16 | Stop reason: SDUPTHER

## 2022-10-24 NOTE — TELEPHONE ENCOUNTER
Medication:   Requested Prescriptions     Pending Prescriptions Disp Refills    amphetamine-dextroamphetamine (ADDERALL, 20MG,) 20 MG tablet 15 tablet 0     Sig: Take 1 tablet by mouth daily for 15 days. Last Filled:  9/23/22    Patient Phone Number: [phone] (home)     Last appt: 9/23/2022   Next appt: Visit date not found    Last OARRS: No flowsheet data found.

## 2022-11-16 DIAGNOSIS — F98.8 ATTENTION DEFICIT DISORDER (ADD) WITHOUT HYPERACTIVITY: ICD-10-CM

## 2022-11-16 DIAGNOSIS — R79.89 ELEVATED PROLACTIN LEVEL: ICD-10-CM

## 2022-11-16 DIAGNOSIS — D35.2 PITUITARY MICROADENOMA (HCC): ICD-10-CM

## 2022-11-16 LAB
A/G RATIO: 2 (ref 1.1–2.2)
ALBUMIN SERPL-MCNC: 4.3 G/DL (ref 3.4–5)
ALP BLD-CCNC: 73 U/L (ref 40–129)
ALT SERPL-CCNC: 28 U/L (ref 10–40)
ANION GAP SERPL CALCULATED.3IONS-SCNC: 8 MMOL/L (ref 3–16)
AST SERPL-CCNC: 19 U/L (ref 15–37)
BILIRUB SERPL-MCNC: 1 MG/DL (ref 0–1)
BUN BLDV-MCNC: 12 MG/DL (ref 7–20)
CALCIUM SERPL-MCNC: 9.6 MG/DL (ref 8.3–10.6)
CHLORIDE BLD-SCNC: 102 MMOL/L (ref 99–110)
CO2: 28 MMOL/L (ref 21–32)
CREAT SERPL-MCNC: 0.8 MG/DL (ref 0.9–1.3)
GFR SERPL CREATININE-BSD FRML MDRD: >60 ML/MIN/{1.73_M2}
GLUCOSE BLD-MCNC: 91 MG/DL (ref 70–99)
POTASSIUM SERPL-SCNC: 4.3 MMOL/L (ref 3.5–5.1)
PROLACTIN: 2.5 NG/ML
SODIUM BLD-SCNC: 138 MMOL/L (ref 136–145)
T3 FREE: 3.7 PG/ML (ref 2.3–4.2)
T4 FREE: 1.3 NG/DL (ref 0.9–1.8)
TOTAL PROTEIN: 6.5 G/DL (ref 6.4–8.2)
TSH SERPL DL<=0.05 MIU/L-ACNC: 0.52 UIU/ML (ref 0.27–4.2)

## 2022-11-16 RX ORDER — DEXTROAMPHETAMINE SACCHARATE, AMPHETAMINE ASPARTATE, DEXTROAMPHETAMINE SULFATE AND AMPHETAMINE SULFATE 5; 5; 5; 5 MG/1; MG/1; MG/1; MG/1
20 TABLET ORAL DAILY
Qty: 15 TABLET | Refills: 0 | Status: SHIPPED | OUTPATIENT
Start: 2022-11-16 | End: 2022-12-01

## 2022-11-16 RX ORDER — CABERGOLINE 0.5 MG/1
0.25 TABLET ORAL
Qty: 8 TABLET | Refills: 5 | OUTPATIENT
Start: 2022-11-17 | End: 2023-11-17

## 2022-11-16 NOTE — TELEPHONE ENCOUNTER
Medication:   Requested Prescriptions     Pending Prescriptions Disp Refills    amphetamine-dextroamphetamine (ADDERALL, 20MG,) 20 MG tablet 15 tablet 0     Sig: Take 1 tablet by mouth daily for 15 days. Last Filled:  10/24/2022    Patient Phone Number: [phone] (home)     Last appt: 9/23/2022   Next appt: Visit date not found    Last OARRS: No flowsheet data found.

## 2022-11-16 NOTE — TELEPHONE ENCOUNTER
Requested Refill:   Requested Prescriptions     Pending Prescriptions Disp Refills    cabergoline (DOSTINEX) 0.5 MG tablet 8 tablet 5     Sig: Take 0.5 tablets by mouth Twice a Week       Last refilled: 2/17/2022 # 8 with 5 refills     Last Appt: 2/17/2022  Next Appt: follow up needed

## 2022-11-28 DIAGNOSIS — D35.2 PITUITARY MICROADENOMA (HCC): ICD-10-CM

## 2022-11-28 DIAGNOSIS — R79.89 ELEVATED PROLACTIN LEVEL: ICD-10-CM

## 2022-11-28 NOTE — TELEPHONE ENCOUNTER
Medication:   Requested Prescriptions     Pending Prescriptions Disp Refills    fluticasone (FLONASE) 50 MCG/ACT nasal spray 32 g 2     Si spray by Each Nostril route daily        Last Filled:  22    Patient Phone Number: [phone] (home)     Last appt: 2022   Next appt: Visit date not found    Last OARRS: No flowsheet data found.

## 2022-11-28 NOTE — TELEPHONE ENCOUNTER
Requested Refill:   Requested Prescriptions     Pending Prescriptions Disp Refills    cabergoline (DOSTINEX) 0.5 MG tablet 8 tablet 5     Sig: Take 0.5 tablets by mouth Twice a Week       Cabergoline :  2/17/2022 # 8 tablets with 5 refills     Refill requested too soon by the pharmacy.

## 2022-11-29 RX ORDER — FLUTICASONE PROPIONATE 50 MCG
1 SPRAY, SUSPENSION (ML) NASAL DAILY
Qty: 32 G | Refills: 2 | Status: SHIPPED | OUTPATIENT
Start: 2022-11-29

## 2022-11-29 RX ORDER — CABERGOLINE 0.5 MG/1
0.25 TABLET ORAL
Qty: 8 TABLET | Refills: 5 | OUTPATIENT
Start: 2022-12-01 | End: 2023-12-01

## 2023-01-03 DIAGNOSIS — F98.8 ATTENTION DEFICIT DISORDER (ADD) WITHOUT HYPERACTIVITY: ICD-10-CM

## 2023-01-03 RX ORDER — DEXTROAMPHETAMINE SACCHARATE, AMPHETAMINE ASPARTATE, DEXTROAMPHETAMINE SULFATE AND AMPHETAMINE SULFATE 5; 5; 5; 5 MG/1; MG/1; MG/1; MG/1
20 TABLET ORAL DAILY
Qty: 15 TABLET | Refills: 0 | Status: SHIPPED | OUTPATIENT
Start: 2023-01-03 | End: 2023-01-18

## 2023-02-22 DIAGNOSIS — F98.8 ATTENTION DEFICIT DISORDER (ADD) WITHOUT HYPERACTIVITY: ICD-10-CM

## 2023-02-22 RX ORDER — DEXTROAMPHETAMINE SACCHARATE, AMPHETAMINE ASPARTATE, DEXTROAMPHETAMINE SULFATE AND AMPHETAMINE SULFATE 5; 5; 5; 5 MG/1; MG/1; MG/1; MG/1
20 TABLET ORAL DAILY
Qty: 30 TABLET | Refills: 0 | Status: SHIPPED | OUTPATIENT
Start: 2023-02-22 | End: 2023-03-24

## 2023-02-22 NOTE — TELEPHONE ENCOUNTER
Medication:   Requested Prescriptions     Pending Prescriptions Disp Refills    amphetamine-dextroamphetamine (ADDERALL, 20MG,) 20 MG tablet 30 tablet 0     Sig: Take 1 tablet by mouth daily for 30 days.  Max Daily Amount: 20 mg        Last Filled: 1/3/2023   Last appt: 9/23/2022   Next appt: none

## 2023-02-22 NOTE — TELEPHONE ENCOUNTER
Left a detailed message for patient advising him that will need an appointment before his next refill

## 2023-04-20 ENCOUNTER — TELEPHONE (OUTPATIENT)
Dept: FAMILY MEDICINE CLINIC | Age: 33
End: 2023-04-20

## 2023-05-18 ENCOUNTER — OFFICE VISIT (OUTPATIENT)
Dept: FAMILY MEDICINE CLINIC | Age: 33
End: 2023-05-18
Payer: COMMERCIAL

## 2023-05-18 VITALS
HEART RATE: 75 BPM | RESPIRATION RATE: 18 BRPM | WEIGHT: 175 LBS | TEMPERATURE: 97.7 F | HEIGHT: 69 IN | SYSTOLIC BLOOD PRESSURE: 114 MMHG | DIASTOLIC BLOOD PRESSURE: 82 MMHG | OXYGEN SATURATION: 98 % | BODY MASS INDEX: 25.92 KG/M2

## 2023-05-18 DIAGNOSIS — Z00.00 WELL ADULT EXAM: Primary | ICD-10-CM

## 2023-05-18 DIAGNOSIS — R79.89 ELEVATED PROLACTIN LEVEL: ICD-10-CM

## 2023-05-18 DIAGNOSIS — D35.2 PITUITARY MICROADENOMA (HCC): ICD-10-CM

## 2023-05-18 DIAGNOSIS — F98.8 ATTENTION DEFICIT DISORDER (ADD) WITHOUT HYPERACTIVITY: ICD-10-CM

## 2023-05-18 PROCEDURE — 99395 PREV VISIT EST AGE 18-39: CPT | Performed by: FAMILY MEDICINE

## 2023-05-18 RX ORDER — DEXTROAMPHETAMINE SACCHARATE, AMPHETAMINE ASPARTATE, DEXTROAMPHETAMINE SULFATE AND AMPHETAMINE SULFATE 7.5; 7.5; 7.5; 7.5 MG/1; MG/1; MG/1; MG/1
30 TABLET ORAL DAILY
Qty: 30 TABLET | Refills: 0 | Status: SHIPPED | OUTPATIENT
Start: 2023-05-18 | End: 2023-06-17

## 2023-05-18 RX ORDER — CABERGOLINE 0.5 MG/1
0.25 TABLET ORAL WEEKLY
Qty: 12 TABLET | Refills: 1 | Status: SHIPPED | OUTPATIENT
Start: 2023-05-18 | End: 2024-05-17

## 2023-05-18 SDOH — ECONOMIC STABILITY: HOUSING INSECURITY
IN THE LAST 12 MONTHS, WAS THERE A TIME WHEN YOU DID NOT HAVE A STEADY PLACE TO SLEEP OR SLEPT IN A SHELTER (INCLUDING NOW)?: NO

## 2023-05-18 SDOH — ECONOMIC STABILITY: FOOD INSECURITY: WITHIN THE PAST 12 MONTHS, YOU WORRIED THAT YOUR FOOD WOULD RUN OUT BEFORE YOU GOT MONEY TO BUY MORE.: NEVER TRUE

## 2023-05-18 SDOH — ECONOMIC STABILITY: INCOME INSECURITY: HOW HARD IS IT FOR YOU TO PAY FOR THE VERY BASICS LIKE FOOD, HOUSING, MEDICAL CARE, AND HEATING?: NOT HARD AT ALL

## 2023-05-18 SDOH — ECONOMIC STABILITY: FOOD INSECURITY: WITHIN THE PAST 12 MONTHS, THE FOOD YOU BOUGHT JUST DIDN'T LAST AND YOU DIDN'T HAVE MONEY TO GET MORE.: NEVER TRUE

## 2023-05-18 ASSESSMENT — PATIENT HEALTH QUESTIONNAIRE - PHQ9
SUM OF ALL RESPONSES TO PHQ QUESTIONS 1-9: 0
SUM OF ALL RESPONSES TO PHQ9 QUESTIONS 1 & 2: 0
1. LITTLE INTEREST OR PLEASURE IN DOING THINGS: 0
SUM OF ALL RESPONSES TO PHQ QUESTIONS 1-9: 0
2. FEELING DOWN, DEPRESSED OR HOPELESS: 0

## 2023-05-18 ASSESSMENT — ENCOUNTER SYMPTOMS: RESPIRATORY NEGATIVE: 1

## 2023-05-18 NOTE — PROGRESS NOTES
Tae Juarez (:  1990) is a 27 y.o. male,Established patient, here for evaluation of the following chief complaint(s): Annual Exam (Pt is here for annual check up) and Medication Check         ASSESSMENT/PLAN:  Zahra Swanson was seen today for annual exam and medication check. Diagnoses and all orders for this visit:    Well adult exam  -     Lipid Panel; Future  -     Comprehensive Metabolic Panel; Future  Will work on diet and exercise  Attention deficit disorder (ADD) without hyperactivity  -     amphetamine-dextroamphetamine (ADDERALL, 30MG,) 30 MG tablet; Take 1 tablet by mouth daily for 30 days. Max Daily Amount: 30 mg  Not well controlled. Can titrate up on adderall  Elevated prolactin level  -     cabergoline (DOSTINEX) 0.5 MG tablet; Take 0.5 tablets by mouth once a week  -     Prolactin; Future  -     TSH; Future  -     T4, Free; Future  -     T3, Free; Future  Has been off dostinex  Was taking 1/2 once/week  Due for follow up with endo. Pt requesting recheck blood work to see if he still needs it since he's been off it for a few months. Pituitary microadenoma (HCC)  -     cabergoline (DOSTINEX) 0.5 MG tablet; Take 0.5 tablets by mouth once a week  -     Prolactin; Future  -     TSH; Future  -     T4, Free; Future  -     T3, Free; Future         No follow-ups on file. Subjective   SUBJECTIVE/OBJECTIVE:  HPI  Pt is a of 27 y.o. male comes in today with   Chief Complaint   Patient presents with    Annual Exam     Pt is here for annual check up    Medication Check     Adderall not working as well  Off dostinex for a few months since rx was denied  blood work in nov good but off med at that time. Diet has not been good. Not exercising.     Vitals:    23 0808   BP: 114/82   Site: Right Upper Arm   Position: Sitting   Cuff Size: Large Adult   Pulse: 75   Resp: 18   Temp: 97.7 °F (36.5 °C)   SpO2: 98%   Weight: 175 lb (79.4 kg)   Height: 5' 9\" (1.753 m)      Review of Systems

## 2023-08-08 DIAGNOSIS — R79.89 ELEVATED PROLACTIN LEVEL: ICD-10-CM

## 2023-08-08 DIAGNOSIS — Z00.00 WELL ADULT EXAM: ICD-10-CM

## 2023-08-08 DIAGNOSIS — D35.2 PITUITARY MICROADENOMA (HCC): ICD-10-CM

## 2023-08-08 LAB
ALBUMIN SERPL-MCNC: 4.7 G/DL (ref 3.4–5)
ALBUMIN/GLOB SERPL: 2.1 {RATIO} (ref 1.1–2.2)
ALP SERPL-CCNC: 82 U/L (ref 40–129)
ALT SERPL-CCNC: 26 U/L (ref 10–40)
ANION GAP SERPL CALCULATED.3IONS-SCNC: 11 MMOL/L (ref 3–16)
AST SERPL-CCNC: 23 U/L (ref 15–37)
BILIRUB SERPL-MCNC: 1.2 MG/DL (ref 0–1)
BUN SERPL-MCNC: 11 MG/DL (ref 7–20)
CALCIUM SERPL-MCNC: 9.8 MG/DL (ref 8.3–10.6)
CHLORIDE SERPL-SCNC: 104 MMOL/L (ref 99–110)
CHOLEST SERPL-MCNC: 187 MG/DL (ref 0–199)
CO2 SERPL-SCNC: 26 MMOL/L (ref 21–32)
CREAT SERPL-MCNC: 0.9 MG/DL (ref 0.9–1.3)
GFR SERPLBLD CREATININE-BSD FMLA CKD-EPI: >60 ML/MIN/{1.73_M2}
GLUCOSE SERPL-MCNC: 100 MG/DL (ref 70–99)
HDLC SERPL-MCNC: 54 MG/DL (ref 40–60)
LDLC SERPL CALC-MCNC: 110 MG/DL
POTASSIUM SERPL-SCNC: 4.3 MMOL/L (ref 3.5–5.1)
PROLACTIN SERPL IA-MCNC: 16.2 NG/ML
PROT SERPL-MCNC: 6.9 G/DL (ref 6.4–8.2)
SODIUM SERPL-SCNC: 141 MMOL/L (ref 136–145)
T3FREE SERPL-MCNC: 3.6 PG/ML (ref 2.3–4.2)
T4 FREE SERPL-MCNC: 1.3 NG/DL (ref 0.9–1.8)
TRIGL SERPL-MCNC: 116 MG/DL (ref 0–150)
TSH SERPL DL<=0.005 MIU/L-ACNC: 0.41 UIU/ML (ref 0.27–4.2)
VLDLC SERPL CALC-MCNC: 23 MG/DL

## 2023-08-14 DIAGNOSIS — F98.8 ATTENTION DEFICIT DISORDER (ADD) WITHOUT HYPERACTIVITY: ICD-10-CM

## 2023-08-14 NOTE — TELEPHONE ENCOUNTER
Medication:   Requested Prescriptions     Pending Prescriptions Disp Refills    amphetamine-dextroamphetamine (ADDERALL, 30MG,) 30 MG tablet 30 tablet 0     Sig: Take 1 tablet by mouth daily for 30 days. Max Daily Amount: 30 mg        Last Filled:  5/18/23    Patient Phone Number: [phone] (home)     Last appt: 5/18/2023   Next appt: Visit date not found    Last OARRS: No flowsheet data found.

## 2023-08-15 RX ORDER — DEXTROAMPHETAMINE SACCHARATE, AMPHETAMINE ASPARTATE, DEXTROAMPHETAMINE SULFATE AND AMPHETAMINE SULFATE 7.5; 7.5; 7.5; 7.5 MG/1; MG/1; MG/1; MG/1
30 TABLET ORAL DAILY
Qty: 30 TABLET | Refills: 0 | Status: SHIPPED | OUTPATIENT
Start: 2023-08-15 | End: 2023-09-14

## 2023-10-17 DIAGNOSIS — F98.8 ATTENTION DEFICIT DISORDER (ADD) WITHOUT HYPERACTIVITY: ICD-10-CM

## 2023-10-18 RX ORDER — DEXTROAMPHETAMINE SACCHARATE, AMPHETAMINE ASPARTATE, DEXTROAMPHETAMINE SULFATE AND AMPHETAMINE SULFATE 7.5; 7.5; 7.5; 7.5 MG/1; MG/1; MG/1; MG/1
30 TABLET ORAL DAILY
Qty: 30 TABLET | Refills: 0 | Status: SHIPPED | OUTPATIENT
Start: 2023-10-18 | End: 2023-11-17

## 2024-01-12 ENCOUNTER — OFFICE VISIT (OUTPATIENT)
Dept: FAMILY MEDICINE CLINIC | Age: 34
End: 2024-01-12
Payer: COMMERCIAL

## 2024-01-12 VITALS
DIASTOLIC BLOOD PRESSURE: 76 MMHG | OXYGEN SATURATION: 98 % | BODY MASS INDEX: 24.88 KG/M2 | HEIGHT: 69 IN | SYSTOLIC BLOOD PRESSURE: 122 MMHG | WEIGHT: 168 LBS | HEART RATE: 97 BPM

## 2024-01-12 DIAGNOSIS — F98.8 ATTENTION DEFICIT DISORDER (ADD) WITHOUT HYPERACTIVITY: ICD-10-CM

## 2024-01-12 DIAGNOSIS — Z00.00 WELL ADULT EXAM: Primary | ICD-10-CM

## 2024-01-12 PROCEDURE — 90674 CCIIV4 VAC NO PRSV 0.5 ML IM: CPT | Performed by: FAMILY MEDICINE

## 2024-01-12 PROCEDURE — 99395 PREV VISIT EST AGE 18-39: CPT | Performed by: FAMILY MEDICINE

## 2024-01-12 PROCEDURE — 90471 IMMUNIZATION ADMIN: CPT | Performed by: FAMILY MEDICINE

## 2024-01-12 RX ORDER — DEXTROAMPHETAMINE SACCHARATE, AMPHETAMINE ASPARTATE, DEXTROAMPHETAMINE SULFATE AND AMPHETAMINE SULFATE 5; 5; 5; 5 MG/1; MG/1; MG/1; MG/1
20 TABLET ORAL 2 TIMES DAILY
Qty: 60 TABLET | Refills: 0 | Status: SHIPPED | OUTPATIENT
Start: 2024-01-12 | End: 2024-02-11

## 2024-01-12 ASSESSMENT — PATIENT HEALTH QUESTIONNAIRE - PHQ9
SUM OF ALL RESPONSES TO PHQ QUESTIONS 1-9: 0
2. FEELING DOWN, DEPRESSED OR HOPELESS: 0
SUM OF ALL RESPONSES TO PHQ9 QUESTIONS 1 & 2: 0
SUM OF ALL RESPONSES TO PHQ9 QUESTIONS 1 & 2: 0
1. LITTLE INTEREST OR PLEASURE IN DOING THINGS: NOT AT ALL
SUM OF ALL RESPONSES TO PHQ QUESTIONS 1-9: 0
SUM OF ALL RESPONSES TO PHQ QUESTIONS 1-9: 0
1. LITTLE INTEREST OR PLEASURE IN DOING THINGS: 0
2. FEELING DOWN, DEPRESSED OR HOPELESS: NOT AT ALL
SUM OF ALL RESPONSES TO PHQ QUESTIONS 1-9: 0

## 2024-01-12 ASSESSMENT — ENCOUNTER SYMPTOMS: RESPIRATORY NEGATIVE: 1

## 2024-01-12 NOTE — PROGRESS NOTES
Dae Gambino (:  1990) is a 33 y.o. male,Established patient, here for evaluation of the following chief complaint(s):  Annual Exam and Medication Refill         ASSESSMENT/PLAN:  Dae was seen today for annual exam and medication refill.    Diagnoses and all orders for this visit:    Well adult exam  Reviewed diet and exercise  Attention deficit disorder (ADD) without hyperactivity  -     amphetamine-dextroamphetamine (ADDERALL, 20MG,) 20 MG tablet; Take 1 tablet by mouth 2 times daily for 30 days. Max Daily Amount: 40 mg  Not well controlled. Can titrate up on adderall  Other orders  -     Influenza, FLUCELVAX, (age 6 mo+), IM, Preservative Free, 0.5 mL         No follow-ups on file.         Subjective   SUBJECTIVE/OBJECTIVE:  HPI  Pt is a of 33 y.o. male comes in today with   Chief Complaint   Patient presents with    Annual Exam    Medication Refill     Adderall use 3-4 days/week.  Xr lasted too long.  IR wears off a little early but coping okay.    Vitals:    24 1542   BP: 122/76   Site: Right Upper Arm   Position: Sitting   Cuff Size: Medium Adult   Pulse: 97   SpO2: 98%   Weight: 76.2 kg (168 lb)   Height: 1.753 m (5' 9\")     Past Medical History:Reviewed  Medications:Reviewed.  No Known Allergies   Social hx:Reviewed.  Social History     Tobacco Use   Smoking Status Never   Smokeless Tobacco Never      Review of Systems   Constitutional: Negative.    Respiratory: Negative.     Cardiovascular: Negative.           Objective   Physical Exam  Constitutional:       Appearance: Normal appearance. He is well-developed.   HENT:      Head: Normocephalic and atraumatic.      Right Ear: Tympanic membrane, ear canal and external ear normal.      Left Ear: Tympanic membrane, ear canal and external ear normal.      Mouth/Throat:      Pharynx: Oropharynx is clear.   Eyes:      General: No scleral icterus.     Conjunctiva/sclera: Conjunctivae normal.   Neck:      Thyroid: No thyromegaly.

## 2024-03-14 DIAGNOSIS — F98.8 ATTENTION DEFICIT DISORDER (ADD) WITHOUT HYPERACTIVITY: ICD-10-CM

## 2024-03-14 NOTE — TELEPHONE ENCOUNTER
Medication:   Requested Prescriptions     Pending Prescriptions Disp Refills    amphetamine-dextroamphetamine (ADDERALL, 20MG,) 20 MG tablet 60 tablet 0     Sig: Take 1 tablet by mouth 2 times daily for 30 days. Max Daily Amount: 40 mg        Last Filled:  1/12/24    Patient Phone Number: [phone] (home)     Last appt: 1/12/2024   Next appt: Visit date not found    Last OARRS:        No data to display

## 2024-03-15 RX ORDER — DEXTROAMPHETAMINE SACCHARATE, AMPHETAMINE ASPARTATE, DEXTROAMPHETAMINE SULFATE AND AMPHETAMINE SULFATE 5; 5; 5; 5 MG/1; MG/1; MG/1; MG/1
20 TABLET ORAL 2 TIMES DAILY
Qty: 60 TABLET | Refills: 0 | Status: SHIPPED | OUTPATIENT
Start: 2024-03-15 | End: 2024-04-14

## 2024-05-03 DIAGNOSIS — D35.2 PITUITARY MICROADENOMA (HCC): ICD-10-CM

## 2024-05-03 DIAGNOSIS — R79.89 ELEVATED PROLACTIN LEVEL: ICD-10-CM

## 2024-05-03 RX ORDER — CABERGOLINE 0.5 MG/1
0.25 TABLET ORAL WEEKLY
Qty: 6 TABLET | Refills: 3 | Status: SHIPPED | OUTPATIENT
Start: 2024-05-03 | End: 2025-05-03

## 2024-05-03 NOTE — TELEPHONE ENCOUNTER
Medication:   Requested Prescriptions     Pending Prescriptions Disp Refills    cabergoline (DOSTINEX) 0.5 MG tablet [Pharmacy Med Name: CABERGOLINE 0.5 MG TABLET] 6 tablet 3     Sig: TAKE 0.5 TABLETS BY MOUTH ONCE A WEEK        Last Filled:  5/18/23    Patient Phone Number: [phone] (home)     Last appt: 1/12/2024   Next appt: Visit date not found    Last OARRS:        No data to display

## 2024-05-10 DIAGNOSIS — F98.8 ATTENTION DEFICIT DISORDER (ADD) WITHOUT HYPERACTIVITY: ICD-10-CM

## 2024-05-10 RX ORDER — DEXTROAMPHETAMINE SACCHARATE, AMPHETAMINE ASPARTATE, DEXTROAMPHETAMINE SULFATE AND AMPHETAMINE SULFATE 5; 5; 5; 5 MG/1; MG/1; MG/1; MG/1
20 TABLET ORAL 2 TIMES DAILY
Qty: 60 TABLET | Refills: 0 | OUTPATIENT
Start: 2024-05-10 | End: 2024-06-09

## 2024-05-14 DIAGNOSIS — F98.8 ATTENTION DEFICIT DISORDER (ADD) WITHOUT HYPERACTIVITY: ICD-10-CM

## 2024-05-14 NOTE — TELEPHONE ENCOUNTER
Possible duplicate: Hover to review recent actions on this medication    Sig: Take 1 tablet by mouth 2 times daily for 30 days. Max Daily Amount: 40 mg    Disp: 60 tablet    Refills: 0    Start: 5/14/2024 - 6/13/2024    Earliest Fill Date: 5/14/2024    Class: Normal    Non-formulary For: Attention deficit disorder (ADD) without hyperactivity    Last ordered: 2 months ago (3/15/2024) by Ezio Ahmadi MD    Patient comment: Hello - I requested a refill on 5/10 and my pharmacy has not received it yet. I am out of medication now   Medication:   Requested Prescriptions     Pending Prescriptions Disp Refills    amphetamine-dextroamphetamine (ADDERALL, 20MG,) 20 MG tablet 60 tablet 0     Sig: Take 1 tablet by mouth 2 times daily for 30 days. Max Daily Amount: 40 mg       Last Filled:  3/15/24    Patient Phone Number: [phone] (home)     Last appt: 1/12/2024   Next appt: Visit date not found    Last Labs DM: No results found for: \"LABA1C\"  Last Lipid:   Lab Results   Component Value Date/Time    CHOL 187 08/08/2023 08:12 AM    TRIG 116 08/08/2023 08:12 AM    HDL 54 08/08/2023 08:12 AM     Last PSA: No results found for: \"PSA\"  Last Thyroid:   Lab Results   Component Value Date/Time    TSH 0.41 08/08/2023 08:12 AM    TSH 0.52 10/13/2020 12:07 PM    FT3 3.6 08/08/2023 08:12 AM    T4FREE 1.3 08/08/2023 08:12 AM

## 2024-05-15 ENCOUNTER — PATIENT MESSAGE (OUTPATIENT)
Dept: FAMILY MEDICINE CLINIC | Age: 34
End: 2024-05-15

## 2024-05-15 RX ORDER — DEXTROAMPHETAMINE SACCHARATE, AMPHETAMINE ASPARTATE, DEXTROAMPHETAMINE SULFATE AND AMPHETAMINE SULFATE 5; 5; 5; 5 MG/1; MG/1; MG/1; MG/1
20 TABLET ORAL 2 TIMES DAILY
Qty: 60 TABLET | Refills: 0 | Status: SHIPPED | OUTPATIENT
Start: 2024-05-15 | End: 2024-06-14

## 2024-05-15 NOTE — TELEPHONE ENCOUNTER
From: Dae Gambino  To: Dr. Ezio Ahmadi  Sent: 5/15/2024 8:35 AM EDT  Subject: Adderral Prescription Refill     Hello Dr Ahmadi,    I have requested a refill for my medication twice and it looks like on mychart it was sent but my pharmacy hasn’t received anything yet. Do you know if there is something preventing them from receiving or filling it?     Thanks,  Karan

## 2024-07-08 DIAGNOSIS — F98.8 ATTENTION DEFICIT DISORDER (ADD) WITHOUT HYPERACTIVITY: ICD-10-CM

## 2024-07-08 RX ORDER — DEXTROAMPHETAMINE SACCHARATE, AMPHETAMINE ASPARTATE, DEXTROAMPHETAMINE SULFATE AND AMPHETAMINE SULFATE 5; 5; 5; 5 MG/1; MG/1; MG/1; MG/1
20 TABLET ORAL 2 TIMES DAILY
Qty: 60 TABLET | Refills: 0 | Status: SHIPPED | OUTPATIENT
Start: 2024-07-08 | End: 2024-08-31 | Stop reason: SDUPTHER

## 2024-07-08 NOTE — TELEPHONE ENCOUNTER
Medication:   Requested Prescriptions     Pending Prescriptions Disp Refills    amphetamine-dextroamphetamine (ADDERALL, 20MG,) 20 MG tablet 60 tablet 0     Sig: Take 1 tablet by mouth 2 times daily for 30 days. Max Daily Amount: 40 mg       Last Filled:  5/15/24    Patient Phone Number: [phone] (home)     Last appt: 1/12/2024   Next appt: Visit date not found    Last Labs DM: No results found for: \"LABA1C\"  Last Lipid:   Lab Results   Component Value Date/Time    CHOL 187 08/08/2023 08:12 AM    TRIG 116 08/08/2023 08:12 AM    HDL 54 08/08/2023 08:12 AM     Last PSA: No results found for: \"PSA\"  Last Thyroid:   Lab Results   Component Value Date/Time    TSH 0.41 08/08/2023 08:12 AM    TSH 0.52 10/13/2020 12:07 PM    FT3 3.6 08/08/2023 08:12 AM    T4FREE 1.3 08/08/2023 08:12 AM

## 2024-08-06 SDOH — ECONOMIC STABILITY: FOOD INSECURITY: WITHIN THE PAST 12 MONTHS, THE FOOD YOU BOUGHT JUST DIDN'T LAST AND YOU DIDN'T HAVE MONEY TO GET MORE.: NEVER TRUE

## 2024-08-06 SDOH — ECONOMIC STABILITY: FOOD INSECURITY: WITHIN THE PAST 12 MONTHS, YOU WORRIED THAT YOUR FOOD WOULD RUN OUT BEFORE YOU GOT MONEY TO BUY MORE.: NEVER TRUE

## 2024-08-06 SDOH — ECONOMIC STABILITY: INCOME INSECURITY: HOW HARD IS IT FOR YOU TO PAY FOR THE VERY BASICS LIKE FOOD, HOUSING, MEDICAL CARE, AND HEATING?: NOT HARD AT ALL

## 2024-08-07 ENCOUNTER — OFFICE VISIT (OUTPATIENT)
Dept: FAMILY MEDICINE CLINIC | Age: 34
End: 2024-08-07
Payer: COMMERCIAL

## 2024-08-07 VITALS
HEIGHT: 69 IN | HEART RATE: 90 BPM | BODY MASS INDEX: 24.29 KG/M2 | WEIGHT: 164 LBS | SYSTOLIC BLOOD PRESSURE: 126 MMHG | OXYGEN SATURATION: 99 % | DIASTOLIC BLOOD PRESSURE: 72 MMHG

## 2024-08-07 DIAGNOSIS — H11.002 PTERYGIUM OF LEFT EYE: ICD-10-CM

## 2024-08-07 DIAGNOSIS — F98.8 ATTENTION DEFICIT DISORDER (ADD) WITHOUT HYPERACTIVITY: Primary | ICD-10-CM

## 2024-08-07 DIAGNOSIS — D35.2 PITUITARY MICROADENOMA (HCC): Chronic | ICD-10-CM

## 2024-08-07 PROBLEM — Z02.89 HEALTH EXAMINATION OF DEFINED SUBPOPULATION: Status: RESOLVED | Noted: 2021-04-21 | Resolved: 2024-08-07

## 2024-08-07 PROBLEM — N62 GYNECOMASTIA, MALE: Chronic | Status: RESOLVED | Noted: 2017-12-01 | Resolved: 2024-08-07

## 2024-08-07 PROCEDURE — 99214 OFFICE O/P EST MOD 30 MIN: CPT | Performed by: FAMILY MEDICINE

## 2024-08-07 RX ORDER — LISDEXAMFETAMINE DIMESYLATE 40 MG/1
40 CAPSULE ORAL DAILY
Qty: 30 CAPSULE | Refills: 0 | Status: SHIPPED | OUTPATIENT
Start: 2024-08-07 | End: 2024-09-06

## 2024-08-07 NOTE — PROGRESS NOTES
Dae Gambino (:  1990) is a 33 y.o. male,Established patient, here for evaluation of the following chief complaint(s):  Follow-up, Discuss Medications, and Other (Wants referral for his eye)      Assessment & Plan   ASSESSMENT/PLAN:  Dae was seen today for follow-up, discuss medications and other.    Diagnoses and all orders for this visit:    Attention deficit disorder (ADD) without hyperactivity  -     Lisdexamfetamine Dimesylate 40 MG CAPS; Take 40 mg by mouth daily for 30 days. Max Daily Amount: 40 mg  Failed adderall and previously failed ritalin and concerta.  Will try vyvanse.  Pituitary microadenoma (HCC)  -     Prolactin; Future  -     TSH; Future  -     T4, Free; Future  -     Lipid Panel; Future  -     Comprehensive Metabolic Panel; Future  Due for blood work to see if stable.  Pterygium of left eye  -     [facility] - Viola Khan MD, (Anterior Segment Reconstruction, Comprehensive and Surgical Ophthalmology) Ophthalmology, [place]  Referred to ophtho         No follow-ups on file.         Subjective   SUBJECTIVE/OBJECTIVE:  HPI  Pt is a of 33 y.o. male comes in today with   Chief Complaint   Patient presents with    Follow-up    Discuss Medications    Other     Wants referral for his eye     Adderall not as effective last few months.  Not as easy to stay focused.  Not under more stress.   Mood varies-inability to focus makes that worse.  No sleep disruption.    Wants T checked    Growth in eye bothering him; no vision changes but desires tx    Review of Systems       Objective   Physical Exam         An electronic signature was used to authenticate this note.    --Ezio Ahmadi MD

## 2024-08-09 DIAGNOSIS — F98.8 ATTENTION DEFICIT DISORDER (ADD) WITHOUT HYPERACTIVITY: ICD-10-CM

## 2024-08-09 RX ORDER — LISDEXAMFETAMINE DIMESYLATE 40 MG/1
40 CAPSULE ORAL DAILY
Qty: 30 CAPSULE | Refills: 0 | Status: CANCELLED | OUTPATIENT
Start: 2024-08-09 | End: 2024-09-08

## 2024-08-09 RX ORDER — LISDEXAMFETAMINE DIMESYLATE 40 MG/1
40 CAPSULE ORAL DAILY
Qty: 30 CAPSULE | Refills: 0 | Status: SHIPPED | OUTPATIENT
Start: 2024-08-09 | End: 2024-09-08

## 2024-08-09 NOTE — TELEPHONE ENCOUNTER
Patient comment: The current order sent to [facility] can’t be filled for multiple weeks due to backorder. I called this [facility] location and they said they had the branded Vyvanse only but did have inventory available. Please send new script for branded version to [facility].

## 2024-08-19 DIAGNOSIS — D35.2 PITUITARY MICROADENOMA (HCC): Chronic | ICD-10-CM

## 2024-08-20 LAB
ALBUMIN SERPL-MCNC: 4.4 G/DL (ref 3.4–5)
ALBUMIN/GLOB SERPL: 2 {RATIO} (ref 1.1–2.2)
ALP SERPL-CCNC: 81 U/L (ref 40–129)
ALT SERPL-CCNC: 23 U/L (ref 10–40)
ANION GAP SERPL CALCULATED.3IONS-SCNC: 11 MMOL/L (ref 3–16)
AST SERPL-CCNC: 19 U/L (ref 15–37)
BILIRUB SERPL-MCNC: 0.7 MG/DL (ref 0–1)
BUN SERPL-MCNC: 10 MG/DL (ref 7–20)
CALCIUM SERPL-MCNC: 9.3 MG/DL (ref 8.3–10.6)
CHLORIDE SERPL-SCNC: 101 MMOL/L (ref 99–110)
CHOLEST SERPL-MCNC: 169 MG/DL (ref 0–199)
CO2 SERPL-SCNC: 28 MMOL/L (ref 21–32)
CREAT SERPL-MCNC: 0.9 MG/DL (ref 0.9–1.3)
GFR SERPLBLD CREATININE-BSD FMLA CKD-EPI: >90 ML/MIN/{1.73_M2}
GLUCOSE SERPL-MCNC: 90 MG/DL (ref 70–99)
HDLC SERPL-MCNC: 56 MG/DL (ref 40–60)
LDLC SERPL CALC-MCNC: 93 MG/DL
POTASSIUM SERPL-SCNC: 4.5 MMOL/L (ref 3.5–5.1)
PROLACTIN SERPL IA-MCNC: 1.7 NG/ML
PROT SERPL-MCNC: 6.6 G/DL (ref 6.4–8.2)
SODIUM SERPL-SCNC: 140 MMOL/L (ref 136–145)
T4 FREE SERPL-MCNC: 1.3 NG/DL (ref 0.9–1.8)
TRIGL SERPL-MCNC: 102 MG/DL (ref 0–150)
TSH SERPL DL<=0.005 MIU/L-ACNC: 0.59 UIU/ML (ref 0.27–4.2)
VLDLC SERPL CALC-MCNC: 20 MG/DL

## 2024-08-31 DIAGNOSIS — F98.8 ATTENTION DEFICIT DISORDER (ADD) WITHOUT HYPERACTIVITY: ICD-10-CM

## 2024-09-03 RX ORDER — DEXTROAMPHETAMINE SACCHARATE, AMPHETAMINE ASPARTATE, DEXTROAMPHETAMINE SULFATE AND AMPHETAMINE SULFATE 5; 5; 5; 5 MG/1; MG/1; MG/1; MG/1
20 TABLET ORAL 2 TIMES DAILY
Qty: 60 TABLET | Refills: 0 | Status: SHIPPED | OUTPATIENT
Start: 2024-09-03 | End: 2024-10-03

## 2024-09-03 NOTE — TELEPHONE ENCOUNTER
Medication:   Requested Prescriptions     Pending Prescriptions Disp Refills    amphetamine-dextroamphetamine (ADDERALL, 20MG,) 20 MG tablet 60 tablet 0     Sig: Take 1 tablet by mouth 2 times daily for 30 days. Max Daily Amount: 40 mg        Last Filled:  7/8/24    Patient Phone Number: [phone] (home)     Last appt: 8/7/2024   Next appt: Visit date not found    Last OARRS:        No data to display

## 2024-10-28 DIAGNOSIS — F98.8 ATTENTION DEFICIT DISORDER (ADD) WITHOUT HYPERACTIVITY: ICD-10-CM

## 2024-10-28 NOTE — TELEPHONE ENCOUNTER
Medication:   Requested Prescriptions     Pending Prescriptions Disp Refills    amphetamine-dextroamphetamine (ADDERALL, 20MG,) 20 MG tablet 60 tablet 0     Sig: Take 1 tablet by mouth 2 times daily for 30 days. Max Daily Amount: 40 mg       Last Filled:  9/3/24    Patient Phone Number: [phone] (home)     Last appt: 8/7/2024   Next appt: Visit date not found    Last Labs DM: No results found for: \"LABA1C\"  Last Lipid:   Lab Results   Component Value Date/Time    CHOL 169 08/19/2024 08:45 AM    TRIG 102 08/19/2024 08:45 AM    HDL 56 08/19/2024 08:45 AM     Last PSA: No results found for: \"PSA\"  Last Thyroid:   Lab Results   Component Value Date/Time    TSH 0.59 08/19/2024 08:45 AM    TSH 0.52 10/13/2020 12:07 PM    FT3 3.6 08/08/2023 08:12 AM    T4FREE 1.3 08/19/2024 08:45 AM

## 2024-10-29 RX ORDER — DEXTROAMPHETAMINE SACCHARATE, AMPHETAMINE ASPARTATE, DEXTROAMPHETAMINE SULFATE AND AMPHETAMINE SULFATE 5; 5; 5; 5 MG/1; MG/1; MG/1; MG/1
20 TABLET ORAL 2 TIMES DAILY
Qty: 60 TABLET | Refills: 0 | Status: SHIPPED | OUTPATIENT
Start: 2024-10-29 | End: 2024-11-28

## 2024-12-16 DIAGNOSIS — F98.8 ATTENTION DEFICIT DISORDER (ADD) WITHOUT HYPERACTIVITY: ICD-10-CM

## 2024-12-16 RX ORDER — DEXTROAMPHETAMINE SACCHARATE, AMPHETAMINE ASPARTATE, DEXTROAMPHETAMINE SULFATE AND AMPHETAMINE SULFATE 5; 5; 5; 5 MG/1; MG/1; MG/1; MG/1
20 TABLET ORAL 2 TIMES DAILY
Qty: 60 TABLET | Refills: 0 | Status: SHIPPED | OUTPATIENT
Start: 2024-12-16 | End: 2025-01-15

## 2024-12-16 NOTE — TELEPHONE ENCOUNTER
Requested Prescriptions     Pending Prescriptions Disp Refills    amphetamine-dextroamphetamine (ADDERALL, 20MG,) 20 MG tablet 60 tablet 0     Sig: Take 1 tablet by mouth 2 times daily for 30 days. Max Daily Amount: 40 mg     Last OV - 8/7/24  Next OV - 12/27/24  Last refill - 10/29/24  Last labs - 8/19/24

## 2024-12-27 ENCOUNTER — OFFICE VISIT (OUTPATIENT)
Dept: FAMILY MEDICINE CLINIC | Age: 34
End: 2024-12-27

## 2024-12-27 VITALS
HEIGHT: 69 IN | DIASTOLIC BLOOD PRESSURE: 87 MMHG | BODY MASS INDEX: 24.68 KG/M2 | OXYGEN SATURATION: 98 % | WEIGHT: 166.6 LBS | RESPIRATION RATE: 18 BRPM | SYSTOLIC BLOOD PRESSURE: 129 MMHG | HEART RATE: 93 BPM

## 2024-12-27 DIAGNOSIS — E22.1 HYPERPROLACTINEMIA (HCC): ICD-10-CM

## 2024-12-27 DIAGNOSIS — Z01.818 PREOP EXAMINATION: Primary | ICD-10-CM

## 2024-12-27 DIAGNOSIS — F98.8 ATTENTION DEFICIT DISORDER (ADD) WITHOUT HYPERACTIVITY: ICD-10-CM

## 2024-12-27 RX ORDER — ATOMOXETINE 80 MG/1
80 CAPSULE ORAL DAILY
Qty: 30 CAPSULE | Refills: 0 | Status: SHIPPED | OUTPATIENT
Start: 2024-12-27 | End: 2025-01-26

## 2024-12-27 NOTE — PROGRESS NOTES
Dae Gambino (:  1990) is a 34 y.o. male,Established patient, here for evaluation of the following chief complaint(s):  Pre-op Exam (Eye surgery on 2025 by Dr. Laura at [facility] )      Assessment & Plan   ASSESSMENT/PLAN:  Dae \"Karan\" was seen today for pre-op exam.    Diagnoses and all orders for this visit:    Preop examination  Medically patient is at acceptable risk to undergo planned surgery.   Hyperprolactinemia (HCC)  Stable on dostinex  ADD  -     atomoxetine (STRATTERA) 80 MG capsule; Take 1 capsule by mouth daily  Not well controlled. Interested in non stimulant. Will try strattera       No follow-ups on file.         Subjective   SUBJECTIVE/OBJECTIVE:  HPI  Pt is a of 34 y.o. male comes in today with   Chief Complaint   Patient presents with    Pre-op Exam     Eye surgery on 2025 by Dr. Laura at [facility]    No personal or family history of adverse reaction to anesthesia or bleeding tendency.     Vitals:    24 1424   BP: 129/87   Site: Left Upper Arm   Position: Sitting   Cuff Size: Medium Adult   Pulse: 93   Resp: 18   SpO2: 98%   Weight: 75.6 kg (166 lb 9.6 oz)   Height: 1.753 m (5' 9\")     No Known Allergies    Current Outpatient Medications on File Prior to Visit   Medication Sig Dispense Refill    amphetamine-dextroamphetamine (ADDERALL, 20MG,) 20 MG tablet Take 1 tablet by mouth 2 times daily for 30 days. Max Daily Amount: 40 mg 60 tablet 0    cabergoline (DOSTINEX) 0.5 MG tablet TAKE 0.5 TABLETS BY MOUTH ONCE A WEEK 6 tablet 3    medical marijuana Take by mouth as needed.       No current facility-administered medications on file prior to visit.       Past Medical History:   Diagnosis Date    Athlete's foot     Brain tumor (HCC) 2012    Per pt' was pituitary that has resolved.    Eye problem     Right:eye growth:under care of eye specialist:asymptomatic. No vision deficits.    Increased prolactin level     Pituitary microadenoma

## 2024-12-29 PROBLEM — F98.8 ATTENTION DEFICIT DISORDER (ADD) WITHOUT HYPERACTIVITY: Status: ACTIVE | Noted: 2024-12-29

## 2025-03-06 DIAGNOSIS — D35.2 PITUITARY MICROADENOMA (HCC): ICD-10-CM

## 2025-03-06 DIAGNOSIS — R79.89 ELEVATED PROLACTIN LEVEL: ICD-10-CM

## 2025-03-06 NOTE — TELEPHONE ENCOUNTER
Medication:   Requested Prescriptions     Pending Prescriptions Disp Refills    cabergoline (DOSTINEX) 0.5 MG tablet 6 tablet 3     Sig: Take 0.5 tablets by mouth once a week        Last Filled:  5/3/24    Patient Phone Number: [phone] (home)     Last appt: 12/27/2024   Next appt: Visit date not found    Last OARRS:        No data to display

## 2025-03-07 RX ORDER — CABERGOLINE 0.5 MG/1
0.25 TABLET ORAL WEEKLY
Qty: 6 TABLET | Refills: 3 | Status: SHIPPED | OUTPATIENT
Start: 2025-03-07 | End: 2026-03-07

## 2025-03-13 DIAGNOSIS — F98.8 ATTENTION DEFICIT DISORDER (ADD) WITHOUT HYPERACTIVITY: Primary | ICD-10-CM

## 2025-03-13 RX ORDER — DEXTROAMPHETAMINE SACCHARATE, AMPHETAMINE ASPARTATE MONOHYDRATE, DEXTROAMPHETAMINE SULFATE AND AMPHETAMINE SULFATE 5; 5; 5; 5 MG/1; MG/1; MG/1; MG/1
40 CAPSULE, EXTENDED RELEASE ORAL DAILY
Qty: 60 CAPSULE | Refills: 0 | Status: SHIPPED | OUTPATIENT
Start: 2025-03-13 | End: 2025-05-12

## 2025-03-14 ENCOUNTER — TELEPHONE (OUTPATIENT)
Dept: FAMILY MEDICINE CLINIC | Age: 35
End: 2025-03-14

## 2025-03-14 NOTE — TELEPHONE ENCOUNTER
Patient called and stated he was unable to  his Adderall prescription, the pharmacy told him he needed a PA. He would like someone to call him back to explain what he needs to do to obtain one.

## 2025-03-14 NOTE — TELEPHONE ENCOUNTER
Please start a PA amphetamine-dextroamphetamine (ADDERALL XR) 20 MG extended release capsule      Thank you

## 2025-03-17 NOTE — TELEPHONE ENCOUNTER
Submitted PA for Amphetamine-Dextroamphet ER 20MG er capsules  Via CM Key: [id number] STATUS: PENDING.    Follow up done daily; if no decision with in three days we will refax.  If another three days goes by with no decision will call the insurance for status.

## 2025-05-05 DIAGNOSIS — F98.8 ATTENTION DEFICIT DISORDER (ADD) WITHOUT HYPERACTIVITY: ICD-10-CM

## 2025-05-05 NOTE — TELEPHONE ENCOUNTER
Medication:   Requested Prescriptions     Pending Prescriptions Disp Refills    amphetamine-dextroamphetamine (ADDERALL XR) 20 MG extended release capsule 60 capsule 0     Sig: Take 2 capsules by mouth daily for 60 days. Max Daily Amount: 40 mg       Last Filled:      Patient Phone Number: [phone] (home)     Last appt: 12/27/2024   Next appt: Visit date not found    Last Labs DM: No results found for: \"LABA1C\"  Last Lipid:   Lab Results   Component Value Date/Time    CHOL 169 08/19/2024 08:45 AM    TRIG 102 08/19/2024 08:45 AM    HDL 56 08/19/2024 08:45 AM     Last PSA: No results found for: \"PSA\"  Last Thyroid:   Lab Results   Component Value Date/Time    TSH 0.59 08/19/2024 08:45 AM    TSH 0.52 10/13/2020 12:07 PM    FT3 3.6 08/08/2023 08:12 AM    T4FREE 1.3 08/19/2024 08:45 AM

## 2025-05-06 RX ORDER — DEXTROAMPHETAMINE SACCHARATE, AMPHETAMINE ASPARTATE MONOHYDRATE, DEXTROAMPHETAMINE SULFATE AND AMPHETAMINE SULFATE 5; 5; 5; 5 MG/1; MG/1; MG/1; MG/1
40 CAPSULE, EXTENDED RELEASE ORAL DAILY
Qty: 60 CAPSULE | Refills: 0 | Status: SHIPPED | OUTPATIENT
Start: 2025-05-06 | End: 2025-07-05

## 2025-06-03 ENCOUNTER — TELEMEDICINE ON DEMAND (OUTPATIENT)
Age: 35
End: 2025-06-03
Payer: COMMERCIAL

## 2025-06-03 DIAGNOSIS — F98.8 ATTENTION DEFICIT DISORDER (ADD) WITHOUT HYPERACTIVITY: ICD-10-CM

## 2025-06-03 DIAGNOSIS — D35.2 PITUITARY MICROADENOMA (HCC): Chronic | ICD-10-CM

## 2025-06-03 DIAGNOSIS — Z13.220 SCREENING, LIPID: ICD-10-CM

## 2025-06-03 DIAGNOSIS — R53.83 FATIGUE, UNSPECIFIED TYPE: ICD-10-CM

## 2025-06-03 DIAGNOSIS — R46.89 LOW MOTIVATION: ICD-10-CM

## 2025-06-03 DIAGNOSIS — E22.1 HYPERPROLACTINEMIA: ICD-10-CM

## 2025-06-03 DIAGNOSIS — R41.89 BRAIN FOG: Primary | ICD-10-CM

## 2025-06-03 PROCEDURE — 99214 OFFICE O/P EST MOD 30 MIN: CPT | Performed by: NURSE PRACTITIONER

## 2025-06-03 NOTE — PROGRESS NOTES
Dae Gambino (:  1990) is a Established patient, here for evaluation of the following:    Fatigue (C/o brain fog/limited motivation, concerned it is due to low testosterone )       Assessment & Plan:  Below is the assessment and plan developed based on review of pertinent history, physical exam, labs, studies, and medications    1. Brain fog  New, unclear etiology   Check labs as below   Consider underlying mood etiology  Recommended patient make a follow-up appt with his PCP to discuss concerns and lab findings  Also mentioned possible value of treating with wellbutrin in combination with current regimen (good for motivation, attention, depression - low risk of weight or sexual side effects). He seemed interested in discussing with PCP as well.   Advised healthy diet, regular exercise, good sleep hygiene, stress management.   -     CBC with Auto Differential; Future  -     Comprehensive Metabolic Panel; Future  -     TSH; Future  -     T4, Free; Future  -     Testosterone Free and Total Male; Future  -     Vitamin D 25 Hydroxy; Future    Aware he should go for labs - fasting - between 8-10am for most accurate testosterone level.     Could consider follow-up with endo if symptoms persisting.    2. Low motivation  New, unclear etiology   Check labs as below   Consider underlying mood etiology  Recommended patient make a follow-up appt with his PCP to discuss concerns and lab findings  Also mentioned possible value of treating with wellbutrin in combination with current regimen (good for motivation, attention, depression - low risk of weight or sexual side effects). He seemed interested in discussing with PCP as well.   Advised healthy diet, regular exercise, good sleep hygiene, stress management.     -     CBC with Auto Differential; Future  -     Comprehensive Metabolic Panel; Future  -     TSH; Future  -     T4, Free; Future  -     Testosterone Free and Total Male; Future  -     Vitamin D 25 Hydroxy;

## 2025-06-04 DIAGNOSIS — R46.89 LOW MOTIVATION: ICD-10-CM

## 2025-06-04 DIAGNOSIS — R53.83 FATIGUE, UNSPECIFIED TYPE: ICD-10-CM

## 2025-06-04 DIAGNOSIS — Z13.220 SCREENING, LIPID: ICD-10-CM

## 2025-06-04 DIAGNOSIS — R41.89 BRAIN FOG: ICD-10-CM

## 2025-06-04 DIAGNOSIS — E22.1 HYPERPROLACTINEMIA: ICD-10-CM

## 2025-06-04 DIAGNOSIS — D35.2 PITUITARY MICROADENOMA (HCC): Chronic | ICD-10-CM

## 2025-06-05 LAB
25(OH)D3 SERPL-MCNC: 19.5 NG/ML
ALBUMIN SERPL-MCNC: 4.3 G/DL (ref 3.4–5)
ALBUMIN/GLOB SERPL: 2 {RATIO} (ref 1.1–2.2)
ALP SERPL-CCNC: 84 U/L (ref 40–129)
ALT SERPL-CCNC: 21 U/L (ref 10–40)
ANION GAP SERPL CALCULATED.3IONS-SCNC: 8 MMOL/L (ref 3–16)
AST SERPL-CCNC: 18 U/L (ref 15–37)
BASOPHILS # BLD: 0.1 K/UL (ref 0–0.2)
BASOPHILS NFR BLD: 0.8 %
BILIRUB SERPL-MCNC: 1.2 MG/DL (ref 0–1)
BUN SERPL-MCNC: 10 MG/DL (ref 7–20)
CALCIUM SERPL-MCNC: 9.6 MG/DL (ref 8.3–10.6)
CHLORIDE SERPL-SCNC: 105 MMOL/L (ref 99–110)
CHOLEST SERPL-MCNC: 176 MG/DL (ref 0–199)
CO2 SERPL-SCNC: 28 MMOL/L (ref 21–32)
CREAT SERPL-MCNC: 0.9 MG/DL (ref 0.9–1.3)
DEPRECATED RDW RBC AUTO: 12.8 % (ref 12.4–15.4)
EOSINOPHIL # BLD: 0.2 K/UL (ref 0–0.6)
EOSINOPHIL NFR BLD: 3.1 %
GFR SERPLBLD CREATININE-BSD FMLA CKD-EPI: >90 ML/MIN/{1.73_M2}
GLUCOSE SERPL-MCNC: 108 MG/DL (ref 70–99)
HCT VFR BLD AUTO: 47.6 % (ref 40.5–52.5)
HDLC SERPL-MCNC: 61 MG/DL (ref 40–60)
HGB BLD-MCNC: 15.9 G/DL (ref 13.5–17.5)
LDLC SERPL CALC-MCNC: 97 MG/DL
LYMPHOCYTES # BLD: 1.9 K/UL (ref 1–5.1)
LYMPHOCYTES NFR BLD: 25.5 %
MCH RBC QN AUTO: 30.4 PG (ref 26–34)
MCHC RBC AUTO-ENTMCNC: 33.5 G/DL (ref 31–36)
MCV RBC AUTO: 90.7 FL (ref 80–100)
MONOCYTES # BLD: 0.5 K/UL (ref 0–1.3)
MONOCYTES NFR BLD: 6.9 %
NEUTROPHILS # BLD: 4.8 K/UL (ref 1.7–7.7)
NEUTROPHILS NFR BLD: 63.7 %
PLATELET # BLD AUTO: 302 K/UL (ref 135–450)
PMV BLD AUTO: 8.5 FL (ref 5–10.5)
POTASSIUM SERPL-SCNC: 4.5 MMOL/L (ref 3.5–5.1)
PROLACTIN SERPL IA-MCNC: 5.7 NG/ML
PROT SERPL-MCNC: 6.4 G/DL (ref 6.4–8.2)
RBC # BLD AUTO: 5.25 M/UL (ref 4.2–5.9)
SODIUM SERPL-SCNC: 141 MMOL/L (ref 136–145)
T4 FREE SERPL-MCNC: 1.1 NG/DL (ref 0.9–1.8)
TRIGL SERPL-MCNC: 92 MG/DL (ref 0–150)
TSH SERPL DL<=0.005 MIU/L-ACNC: 0.4 UIU/ML (ref 0.27–4.2)
VLDLC SERPL CALC-MCNC: 18 MG/DL
WBC # BLD AUTO: 7.6 K/UL (ref 4–11)

## 2025-06-06 ENCOUNTER — RESULTS FOLLOW-UP (OUTPATIENT)
Age: 35
End: 2025-06-06

## 2025-06-06 LAB
SHBG SERPL-SCNC: 43 NMOL/L (ref 10–60)
TESTOST FREE SERPL-MCNC: 134.1 PG/ML (ref 47–244)
TESTOST SERPL-MCNC: 708 NG/DL (ref 249–836)

## 2025-06-09 ENCOUNTER — OFFICE VISIT (OUTPATIENT)
Dept: FAMILY MEDICINE CLINIC | Age: 35
End: 2025-06-09
Payer: COMMERCIAL

## 2025-06-09 VITALS
WEIGHT: 168.2 LBS | BODY MASS INDEX: 24.91 KG/M2 | DIASTOLIC BLOOD PRESSURE: 82 MMHG | TEMPERATURE: 97.8 F | SYSTOLIC BLOOD PRESSURE: 122 MMHG | HEIGHT: 69 IN | OXYGEN SATURATION: 97 % | HEART RATE: 77 BPM

## 2025-06-09 DIAGNOSIS — F98.8 ATTENTION DEFICIT DISORDER (ADD) WITHOUT HYPERACTIVITY: Primary | ICD-10-CM

## 2025-06-09 DIAGNOSIS — F34.1 DYSTHYMIA: ICD-10-CM

## 2025-06-09 PROCEDURE — 99214 OFFICE O/P EST MOD 30 MIN: CPT | Performed by: FAMILY MEDICINE

## 2025-06-09 RX ORDER — BUPROPION HYDROCHLORIDE 150 MG/1
150 TABLET ORAL EVERY MORNING
Qty: 30 TABLET | Refills: 2 | Status: SHIPPED | OUTPATIENT
Start: 2025-06-09

## 2025-06-09 SDOH — ECONOMIC STABILITY: FOOD INSECURITY: WITHIN THE PAST 12 MONTHS, YOU WORRIED THAT YOUR FOOD WOULD RUN OUT BEFORE YOU GOT MONEY TO BUY MORE.: NEVER TRUE

## 2025-06-09 SDOH — ECONOMIC STABILITY: FOOD INSECURITY: WITHIN THE PAST 12 MONTHS, THE FOOD YOU BOUGHT JUST DIDN'T LAST AND YOU DIDN'T HAVE MONEY TO GET MORE.: NEVER TRUE

## 2025-06-09 ASSESSMENT — PATIENT HEALTH QUESTIONNAIRE - PHQ9
3. TROUBLE FALLING OR STAYING ASLEEP: NOT AT ALL
1. LITTLE INTEREST OR PLEASURE IN DOING THINGS: SEVERAL DAYS
SUM OF ALL RESPONSES TO PHQ QUESTIONS 1-9: 4
7. TROUBLE CONCENTRATING ON THINGS, SUCH AS READING THE NEWSPAPER OR WATCHING TELEVISION: MORE THAN HALF THE DAYS
6. FEELING BAD ABOUT YOURSELF - OR THAT YOU ARE A FAILURE OR HAVE LET YOURSELF OR YOUR FAMILY DOWN: NOT AT ALL
9. THOUGHTS THAT YOU WOULD BE BETTER OFF DEAD, OR OF HURTING YOURSELF: NOT AT ALL
8. MOVING OR SPEAKING SO SLOWLY THAT OTHER PEOPLE COULD HAVE NOTICED. OR THE OPPOSITE, BEING SO FIGETY OR RESTLESS THAT YOU HAVE BEEN MOVING AROUND A LOT MORE THAN USUAL: NOT AT ALL
2. FEELING DOWN, DEPRESSED OR HOPELESS: NOT AT ALL
10. IF YOU CHECKED OFF ANY PROBLEMS, HOW DIFFICULT HAVE THESE PROBLEMS MADE IT FOR YOU TO DO YOUR WORK, TAKE CARE OF THINGS AT HOME, OR GET ALONG WITH OTHER PEOPLE: SOMEWHAT DIFFICULT
SUM OF ALL RESPONSES TO PHQ QUESTIONS 1-9: 4
5. POOR APPETITE OR OVEREATING: NOT AT ALL
SUM OF ALL RESPONSES TO PHQ QUESTIONS 1-9: 4
SUM OF ALL RESPONSES TO PHQ QUESTIONS 1-9: 4
4. FEELING TIRED OR HAVING LITTLE ENERGY: SEVERAL DAYS

## 2025-06-09 NOTE — PROGRESS NOTES
Dae Gambino (:  1990) is a 34 y.o. male,Established patient, here for evaluation of the following chief complaint(s):  Discuss Labs      Assessment & Plan   ASSESSMENT/PLAN:  Dae \"Karan\" was seen today for discuss labs.    Diagnoses and all orders for this visit:    Attention deficit disorder (ADD) without hyperactivity  Stable on adderall  Dysthymia  Not well controlled.   Will try wellbutrin  Other orders  -     buPROPion (WELLBUTRIN XL) 150 MG extended release tablet; Take 1 tablet by mouth every morning         No follow-ups on file.         Subjective   SUBJECTIVE/OBJECTIVE:  HPI  Pt is a of 34 y.o. male comes in today with   Chief Complaint   Patient presents with    Discuss Labs     Past month more brain fog, anhedonia.  Not as much drive from the adderall.  Exercising more and not helping.    Vitals:    25 0934   BP: 122/82   BP Site: Left Upper Arm   Patient Position: Sitting   BP Cuff Size: Medium Adult   Pulse: 77   Temp: 97.8 °F (36.6 °C)   TempSrc: Temporal   SpO2: 97%   Weight: 76.3 kg (168 lb 3.2 oz)   Height: 1.753 m (5' 9\")       Review of Systems       Objective   Physical Exam         An electronic signature was used to authenticate this note.    --Ezio Ahmadi MD

## 2025-06-11 NOTE — TELEPHONE ENCOUNTER
Spoke with patient regarding labs ordered 6/4/2025. Pt has seen PCP, discussed labs, has a plan, no questions as this time.

## 2025-06-16 DIAGNOSIS — F98.8 ATTENTION DEFICIT DISORDER (ADD) WITHOUT HYPERACTIVITY: ICD-10-CM

## 2025-06-16 RX ORDER — DEXTROAMPHETAMINE SACCHARATE, AMPHETAMINE ASPARTATE MONOHYDRATE, DEXTROAMPHETAMINE SULFATE AND AMPHETAMINE SULFATE 5; 5; 5; 5 MG/1; MG/1; MG/1; MG/1
40 CAPSULE, EXTENDED RELEASE ORAL DAILY
Qty: 60 CAPSULE | Refills: 0 | Status: SHIPPED | OUTPATIENT
Start: 2025-06-16 | End: 2025-08-15

## 2025-06-16 NOTE — TELEPHONE ENCOUNTER
Medication:   Requested Prescriptions     Pending Prescriptions Disp Refills    amphetamine-dextroamphetamine (ADDERALL XR) 20 MG extended release capsule 60 capsule 0     Sig: Take 2 capsules by mouth daily for 60 days. Max Daily Amount: 40 mg       Last Filled:  5/6/25    Patient Phone Number: [phone] (home)     Last appt: 6/9/2025   Next appt: Visit date not found    Last Labs DM: No results found for: \"LABA1C\"  Last Lipid:   Lab Results   Component Value Date/Time    CHOL 176 06/04/2025 08:10 AM    TRIG 92 06/04/2025 08:10 AM    HDL 61 06/04/2025 08:10 AM     Last PSA: No results found for: \"PSA\"  Last Thyroid:   Lab Results   Component Value Date/Time    TSH 0.40 06/04/2025 08:10 AM    TSH 0.52 10/13/2020 12:07 PM    FT3 3.6 08/08/2023 08:12 AM    T4FREE 1.1 06/04/2025 08:10 AM

## 2025-07-01 DIAGNOSIS — D35.2 PITUITARY MICROADENOMA (HCC): ICD-10-CM

## 2025-07-01 DIAGNOSIS — R79.89 ELEVATED PROLACTIN LEVEL: ICD-10-CM

## 2025-07-01 RX ORDER — BUPROPION HYDROCHLORIDE 150 MG/1
150 TABLET ORAL EVERY MORNING
Qty: 90 TABLET | Refills: 0 | Status: SHIPPED | OUTPATIENT
Start: 2025-07-01

## 2025-07-01 RX ORDER — CABERGOLINE 0.5 MG/1
0.25 TABLET ORAL WEEKLY
Qty: 6 TABLET | Refills: 3 | Status: SHIPPED | OUTPATIENT
Start: 2025-07-01 | End: 2026-07-01

## 2025-07-01 NOTE — TELEPHONE ENCOUNTER
Recent Visits  Date Type Provider Dept   06/09/25 Office Visit Ezio Ahmadi MD [facility]   12/27/24 Office Visit Ezio Ahmadi MD [facility]   08/07/24 Office Visit [facility]hmadi MD [facility]   01/12/24 Office Visit Ezio Ahmadi MD [facility]   Showing recent visits within past 540 days with a meds authorizing provider and meeting all other requirements  Future Appointments  No visits were found meeting these conditions.  Showing future appointments within next 150 days with a meds authorizing provider and meeting all other requirements

## 2025-08-03 ENCOUNTER — TELEMEDICINE ON DEMAND (OUTPATIENT)
Age: 35
End: 2025-08-03
Payer: COMMERCIAL

## 2025-08-03 DIAGNOSIS — R30.0 DYSURIA: Primary | ICD-10-CM

## 2025-08-03 PROCEDURE — 99213 OFFICE O/P EST LOW 20 MIN: CPT | Performed by: NURSE PRACTITIONER

## 2025-08-04 DIAGNOSIS — R30.0 DYSURIA: ICD-10-CM

## 2025-08-04 LAB
BACTERIA URNS QL MICRO: ABNORMAL /HPF
BILIRUB UR QL STRIP.AUTO: NEGATIVE
CALCIUM OXALATE CRYSTALS: PRESENT
CLARITY UR: ABNORMAL
COLOR UR: ABNORMAL
EPI CELLS #/AREA URNS AUTO: 0 /HPF (ref 0–5)
GLUCOSE UR STRIP.AUTO-MCNC: NEGATIVE MG/DL
HGB UR QL STRIP.AUTO: NEGATIVE
HYALINE CASTS #/AREA URNS AUTO: 0 /LPF (ref 0–8)
KETONES UR STRIP.AUTO-MCNC: ABNORMAL MG/DL
LEUKOCYTE ESTERASE UR QL STRIP.AUTO: NEGATIVE
NITRITE UR QL STRIP.AUTO: NEGATIVE
PH UR STRIP.AUTO: 5.5 [PH] (ref 5–8)
PROT UR STRIP.AUTO-MCNC: NEGATIVE MG/DL
RBC CLUMPS #/AREA URNS AUTO: 1 /HPF (ref 0–4)
SP GR UR STRIP.AUTO: 1.02 (ref 1–1.03)
UA DIPSTICK W REFLEX MICRO PNL UR: YES
URN SPEC COLLECT METH UR: ABNORMAL
UROBILINOGEN UR STRIP-ACNC: 1 E.U./DL
WBC #/AREA URNS AUTO: 1 /HPF (ref 0–5)

## 2025-08-05 ENCOUNTER — TELEPHONE (OUTPATIENT)
Age: 35
End: 2025-08-05

## 2025-08-05 LAB — BACTERIA UR CULT: NORMAL

## 2025-08-08 DIAGNOSIS — F98.8 ATTENTION DEFICIT DISORDER (ADD) WITHOUT HYPERACTIVITY: ICD-10-CM

## 2025-08-11 RX ORDER — DEXTROAMPHETAMINE SACCHARATE, AMPHETAMINE ASPARTATE MONOHYDRATE, DEXTROAMPHETAMINE SULFATE AND AMPHETAMINE SULFATE 5; 5; 5; 5 MG/1; MG/1; MG/1; MG/1
2 CAPSULE, EXTENDED RELEASE ORAL DAILY
Qty: 60 CAPSULE | Refills: 0 | Status: SHIPPED | OUTPATIENT
Start: 2025-08-11 | End: 2025-09-10

## 2025-08-18 ENCOUNTER — OFFICE VISIT (OUTPATIENT)
Dept: FAMILY MEDICINE CLINIC | Age: 35
End: 2025-08-18
Payer: COMMERCIAL

## 2025-08-18 VITALS
HEART RATE: 99 BPM | BODY MASS INDEX: 24.22 KG/M2 | WEIGHT: 164 LBS | SYSTOLIC BLOOD PRESSURE: 110 MMHG | OXYGEN SATURATION: 99 % | DIASTOLIC BLOOD PRESSURE: 74 MMHG

## 2025-08-18 DIAGNOSIS — N41.0 ACUTE PROSTATITIS: Primary | ICD-10-CM

## 2025-08-18 PROCEDURE — 99213 OFFICE O/P EST LOW 20 MIN: CPT | Performed by: FAMILY MEDICINE

## 2025-08-18 RX ORDER — SULFAMETHOXAZOLE AND TRIMETHOPRIM 800; 160 MG/1; MG/1
1 TABLET ORAL 2 TIMES DAILY
Qty: 28 TABLET | Refills: 0 | Status: SHIPPED | OUTPATIENT
Start: 2025-08-18 | End: 2025-09-01